# Patient Record
Sex: FEMALE | Race: WHITE | NOT HISPANIC OR LATINO | Employment: FULL TIME | URBAN - METROPOLITAN AREA
[De-identification: names, ages, dates, MRNs, and addresses within clinical notes are randomized per-mention and may not be internally consistent; named-entity substitution may affect disease eponyms.]

---

## 2019-10-01 ENCOUNTER — OFFICE VISIT (OUTPATIENT)
Dept: FAMILY MEDICINE | Facility: CLINIC | Age: 34
End: 2019-10-01
Payer: COMMERCIAL

## 2019-10-01 VITALS
BODY MASS INDEX: 36.53 KG/M2 | HEIGHT: 66 IN | HEART RATE: 96 BPM | OXYGEN SATURATION: 98 % | WEIGHT: 227.31 LBS | DIASTOLIC BLOOD PRESSURE: 80 MMHG | SYSTOLIC BLOOD PRESSURE: 118 MMHG

## 2019-10-01 DIAGNOSIS — F41.9 ANXIETY: ICD-10-CM

## 2019-10-01 DIAGNOSIS — Z23 NEED FOR TDAP VACCINATION: ICD-10-CM

## 2019-10-01 DIAGNOSIS — Z00.00 HEALTHCARE MAINTENANCE: ICD-10-CM

## 2019-10-01 DIAGNOSIS — I88.9 LYMPHADENITIS: Primary | ICD-10-CM

## 2019-10-01 PROBLEM — Z78.9 NON-SMOKER: Status: ACTIVE | Noted: 2019-10-01

## 2019-10-01 PROCEDURE — 90715 TDAP VACCINE 7 YRS/> IM: CPT | Mod: S$GLB,,, | Performed by: NURSE PRACTITIONER

## 2019-10-01 PROCEDURE — 90715 TDAP VACCINE GREATER THAN OR EQUAL TO 7YO IM: ICD-10-PCS | Mod: S$GLB,,, | Performed by: NURSE PRACTITIONER

## 2019-10-01 PROCEDURE — 3008F PR BODY MASS INDEX (BMI) DOCUMENTED: ICD-10-PCS | Mod: S$GLB,,, | Performed by: NURSE PRACTITIONER

## 2019-10-01 PROCEDURE — 99214 PR OFFICE/OUTPT VISIT, EST, LEVL IV, 30-39 MIN: ICD-10-PCS | Mod: 25,S$GLB,, | Performed by: NURSE PRACTITIONER

## 2019-10-01 PROCEDURE — 90471 TDAP VACCINE GREATER THAN OR EQUAL TO 7YO IM: ICD-10-PCS | Mod: S$GLB,,, | Performed by: NURSE PRACTITIONER

## 2019-10-01 PROCEDURE — 90471 IMMUNIZATION ADMIN: CPT | Mod: S$GLB,,, | Performed by: NURSE PRACTITIONER

## 2019-10-01 PROCEDURE — 3008F BODY MASS INDEX DOCD: CPT | Mod: S$GLB,,, | Performed by: NURSE PRACTITIONER

## 2019-10-01 PROCEDURE — 99214 OFFICE O/P EST MOD 30 MIN: CPT | Mod: 25,S$GLB,, | Performed by: NURSE PRACTITIONER

## 2019-10-01 RX ORDER — TALC
3 POWDER (GRAM) TOPICAL NIGHTLY
Refills: 0 | COMMUNITY
Start: 2019-10-01

## 2019-10-01 RX ORDER — AMOXICILLIN AND CLAVULANATE POTASSIUM 875; 125 MG/1; MG/1
1 TABLET, FILM COATED ORAL 2 TIMES DAILY
Qty: 20 TABLET | Refills: 0 | Status: ON HOLD | OUTPATIENT
Start: 2019-10-01 | End: 2022-01-19 | Stop reason: HOSPADM

## 2019-10-01 NOTE — PROGRESS NOTES
"    SUBJECTIVE:      Patient ID: Jenny Bailey is a 33 y.o. female.    Chief Complaint: Anxiety (x 1 year) and Mass (in Rt armpit)    Jenny is here today with c/o increasing irritability. She reports it has gotten worse over the last year or so.  She states even the little things are bothering her.  She denies any recent life changes and she denies depression as well.      She also has c/o a tender, lumpy area in her right axilla.  She states it has been tender for about 2 weeks and is not improving.  The "lumpy" part has been on and off for about a year or so.    Anxiety   Presents for follow-up visit. Symptoms include excessive worry, insomnia, irritability and muscle tension. Patient reports no chest pain, compulsions, confusion, decreased concentration, depressed mood, dizziness, dry mouth, feeling of choking, malaise, nausea, nervous/anxious behavior, obsessions, palpitations, panic, restlessness, shortness of breath or suicidal ideas. Symptoms occur most days. The severity of symptoms is moderate. The quality of sleep is poor. Nighttime awakenings: several.       Mass   This is a recurrent problem. The current episode started 1 to 4 weeks ago. The problem occurs constantly. The problem has been unchanged. Pertinent negatives include no abdominal pain, anorexia, arthralgias, change in bowel habit, chest pain, chills, congestion, coughing, diaphoresis, fatigue, fever, headaches, joint swelling, myalgias, nausea, neck pain, numbness, rash, sore throat, urinary symptoms, vertigo, visual change, vomiting or weakness. Exacerbated by: palpation. She has tried nothing for the symptoms.       History reviewed. No pertinent surgical history.  Family History   Problem Relation Age of Onset    Coronary artery disease Maternal Grandmother     Depression Maternal Grandmother     Diabetes type II Maternal Grandmother     Breast cancer Paternal Grandmother     Pancreatic cancer Paternal Grandmother     Hyperlipidemia " Mother     Drug abuse Father       Social History     Socioeconomic History    Marital status:      Spouse name: Not on file    Number of children: Not on file    Years of education: Not on file    Highest education level: Not on file   Occupational History    Occupation: Medical  Assistant   Social Needs    Financial resource strain: Not hard at all    Food insecurity:     Worry: Never true     Inability: Never true    Transportation needs:     Medical: No     Non-medical: No   Tobacco Use    Smoking status: Never Smoker    Smokeless tobacco: Never Used   Substance and Sexual Activity    Alcohol use: Not on file    Drug use: Not Currently    Sexual activity: Yes     Partners: Female   Lifestyle    Physical activity:     Days per week: 4 days     Minutes per session: 120 min    Stress: To some extent   Relationships    Social connections:     Talks on phone: More than three times a week     Gets together: Once a week     Attends [employer] service: Not on file     Active member of club or organization: Yes     Attends meetings of clubs or organizations: More than 4 times per year     Relationship status:    Other Topics Concern    Not on file   Social History Narrative    Not on file     Current Outpatient Medications   Medication Sig Dispense Refill    ranitidine (ZANTAC) 150 MG tablet       amoxicillin-clavulanate 875-125mg (AUGMENTIN) 875-125 mg per tablet Take 1 tablet by mouth 2 (two) times daily. 20 tablet 0    melatonin (MELATIN) Take 1 tablet (3 mg total) by mouth nightly.  0     No current facility-administered medications for this visit.      Review of patient's allergies indicates:  No Known Allergies   History reviewed. No pertinent past medical history.  History reviewed. No pertinent surgical history.    Review of Systems   Constitutional: Positive for irritability. Negative for activity change, chills, diaphoresis, fatigue, fever and unexpected weight change.   HENT:  "Negative for congestion, hearing loss, rhinorrhea, sore throat and trouble swallowing.    Eyes: Negative for discharge and visual disturbance.   Respiratory: Negative for cough, chest tightness, shortness of breath and wheezing.    Cardiovascular: Negative for chest pain and palpitations.   Gastrointestinal: Negative for abdominal pain, anorexia, blood in stool, change in bowel habit, constipation, diarrhea, nausea and vomiting.   Endocrine: Negative for polydipsia and polyuria.   Genitourinary: Negative for difficulty urinating, dysuria, hematuria, menstrual problem, vaginal bleeding and vaginal discharge.   Musculoskeletal: Negative for arthralgias, joint swelling, myalgias and neck pain.   Skin: Negative for rash.   Neurological: Negative for dizziness, vertigo, weakness, numbness and headaches.   Hematological: Does not bruise/bleed easily.   Psychiatric/Behavioral: Negative for confusion, decreased concentration, dysphoric mood and suicidal ideas. The patient has insomnia. The patient is not nervous/anxious.       OBJECTIVE:      Vitals:    10/01/19 1323   BP: 118/80   Pulse: 96   SpO2: 98%   Weight: 103.1 kg (227 lb 4.8 oz)   Height: 5' 6" (1.676 m)     Physical Exam   Constitutional: She is oriented to person, place, and time. She appears well-developed and well-nourished. No distress.   HENT:   Head: Normocephalic and atraumatic.   Right Ear: Tympanic membrane, external ear and ear canal normal.   Left Ear: Tympanic membrane, external ear and ear canal normal.   Nose: Nose normal. No mucosal edema or rhinorrhea.   Mouth/Throat: Uvula is midline and oropharynx is clear and moist. No oropharyngeal exudate, posterior oropharyngeal edema or posterior oropharyngeal erythema.   Eyes: Pupils are equal, round, and reactive to light. Conjunctivae and lids are normal. Right eye exhibits no discharge. Left eye exhibits no discharge. No scleral icterus.   Neck: Normal range of motion. Neck supple. Carotid bruit is not " present. No tracheal deviation present. No thyromegaly present.   Cardiovascular: Normal rate, regular rhythm, normal heart sounds and intact distal pulses. Exam reveals no gallop and no friction rub.   No murmur heard.  Pulmonary/Chest: Effort normal and breath sounds normal. No stridor. No respiratory distress. She has no wheezes. She has no rales.   Abdominal: Soft. Bowel sounds are normal. She exhibits no distension and no mass. There is no hepatosplenomegaly. There is no tenderness. There is no rigidity, no rebound, no guarding and no CVA tenderness.   Musculoskeletal: Normal range of motion. She exhibits no edema.   Lymphadenopathy:        Head (right side): No submandibular, no tonsillar, no posterior auricular and no occipital adenopathy present.        Head (left side): No submandibular, no tonsillar, no posterior auricular and no occipital adenopathy present.     She has no cervical adenopathy.     She has axillary adenopathy.        Right axillary: Lateral adenopathy present.        Left axillary: No lateral adenopathy present.       Right: No supraclavicular adenopathy present.        Left: No supraclavicular adenopathy present.   Neurological: She is alert and oriented to person, place, and time.   Skin: Skin is warm, dry and intact. Capillary refill takes less than 2 seconds. She is not diaphoretic.   Psychiatric: She has a normal mood and affect. Her behavior is normal. Judgment and thought content normal. She expresses no suicidal plans.      Assessment:       1. Lymphadenitis    2. Anxiety    3. Need for Tdap vaccination    4. Healthcare maintenance        Plan:       Lymphadenitis  -     US Soft Tissue Axilla; Future; Expected date: 10/01/2019  -     amoxicillin-clavulanate 875-125mg (AUGMENTIN) 875-125 mg per tablet; Take 1 tablet by mouth 2 (two) times daily.  Dispense: 20 tablet; Refill: 0    Anxiety   Proper sleep hygiene discussed; try melatonin at hs to help with sleep.  Check labs; follow up  if anxiety continues with proper sleep hygiene and labs are normal; understanding voiced.  -     melatonin (MELATIN); Take 1 tablet (3 mg total) by mouth nightly.; Refill: 0  -     TSH; Future; Expected date: 10/01/2019  -     Vitamin D; Future; Expected date: 10/01/2019    Need for Tdap vaccination  -     Tdap Vaccine    Healthcare maintenance  -     CBC auto differential; Future; Expected date: 10/01/2019  -     Comprehensive metabolic panel; Future; Expected date: 10/01/2019  -     Lipid panel; Future; Expected date: 10/01/2019  -     TSH; Future; Expected date: 10/01/2019  -     Urinalysis; Future  -     Vitamin D; Future; Expected date: 10/01/2019        Follow up if symptoms worsen or fail to improve.      10/1/2019 ANGELITO Light, AZIZAP

## 2019-10-03 LAB
25(OH)D3+25(OH)D2 SERPL-MCNC: 15.8 NG/ML (ref 30–100)
ALBUMIN SERPL-MCNC: 4.5 G/DL (ref 3.5–5.5)
ALBUMIN/GLOB SERPL: 1.7 {RATIO} (ref 1.2–2.2)
ALP SERPL-CCNC: 88 IU/L (ref 39–117)
ALT SERPL-CCNC: 12 IU/L (ref 0–32)
APPEARANCE UR: CLEAR
AST SERPL-CCNC: 13 IU/L (ref 0–40)
BASOPHILS # BLD AUTO: 0 X10E3/UL (ref 0–0.2)
BASOPHILS NFR BLD AUTO: 1 %
BILIRUB SERPL-MCNC: 0.3 MG/DL (ref 0–1.2)
BILIRUB UR QL STRIP: NEGATIVE
BUN SERPL-MCNC: 11 MG/DL (ref 6–20)
BUN/CREAT SERPL: 14 (ref 9–23)
CALCIUM SERPL-MCNC: 9.7 MG/DL (ref 8.7–10.2)
CHLORIDE SERPL-SCNC: 102 MMOL/L (ref 96–106)
CHOLEST SERPL-MCNC: 206 MG/DL (ref 100–199)
CO2 SERPL-SCNC: 21 MMOL/L (ref 20–29)
COLOR UR: YELLOW
CREAT SERPL-MCNC: 0.79 MG/DL (ref 0.57–1)
EOSINOPHIL # BLD AUTO: 0.2 X10E3/UL (ref 0–0.4)
EOSINOPHIL NFR BLD AUTO: 3 %
ERYTHROCYTE [DISTWIDTH] IN BLOOD BY AUTOMATED COUNT: 14 % (ref 12.3–15.4)
GLOBULIN SER CALC-MCNC: 2.7 G/DL (ref 1.5–4.5)
GLUCOSE SERPL-MCNC: 95 MG/DL (ref 65–99)
GLUCOSE UR QL: NEGATIVE
HCT VFR BLD AUTO: 39.7 % (ref 34–46.6)
HDLC SERPL-MCNC: 48 MG/DL
HGB BLD-MCNC: 12 G/DL (ref 11.1–15.9)
HGB UR QL STRIP: NEGATIVE
IMM GRANULOCYTES # BLD AUTO: 0 X10E3/UL (ref 0–0.1)
IMM GRANULOCYTES NFR BLD AUTO: 0 %
KETONES UR QL STRIP: NEGATIVE
LDLC SERPL CALC-MCNC: 134 MG/DL (ref 0–99)
LEUKOCYTE ESTERASE UR QL STRIP: NEGATIVE
LYMPHOCYTES # BLD AUTO: 1.9 X10E3/UL (ref 0.7–3.1)
LYMPHOCYTES NFR BLD AUTO: 30 %
MCH RBC QN AUTO: 24.8 PG (ref 26.6–33)
MCHC RBC AUTO-ENTMCNC: 30.2 G/DL (ref 31.5–35.7)
MCV RBC AUTO: 82 FL (ref 79–97)
MICRO URNS: NORMAL
MONOCYTES # BLD AUTO: 0.5 X10E3/UL (ref 0.1–0.9)
MONOCYTES NFR BLD AUTO: 9 %
NEUTROPHILS # BLD AUTO: 3.7 X10E3/UL (ref 1.4–7)
NEUTROPHILS NFR BLD AUTO: 57 %
NITRITE UR QL STRIP: NEGATIVE
PH UR STRIP: 7.5 [PH] (ref 5–7.5)
PLATELET # BLD AUTO: 287 X10E3/UL (ref 150–450)
POTASSIUM SERPL-SCNC: 4.5 MMOL/L (ref 3.5–5.2)
PROT SERPL-MCNC: 7.2 G/DL (ref 6–8.5)
PROT UR QL STRIP: NEGATIVE
RBC # BLD AUTO: 4.84 X10E6/UL (ref 3.77–5.28)
SODIUM SERPL-SCNC: 142 MMOL/L (ref 134–144)
SP GR UR: 1.01 (ref 1–1.03)
TRIGL SERPL-MCNC: 118 MG/DL (ref 0–149)
TSH SERPL DL<=0.005 MIU/L-ACNC: 1.07 UIU/ML (ref 0.45–4.5)
UROBILINOGEN UR STRIP-MCNC: 0.2 MG/DL (ref 0.2–1)
VLDLC SERPL CALC-MCNC: 24 MG/DL (ref 5–40)
WBC # BLD AUTO: 6.3 X10E3/UL (ref 3.4–10.8)

## 2019-10-05 ENCOUNTER — PATIENT MESSAGE (OUTPATIENT)
Dept: FAMILY MEDICINE | Facility: CLINIC | Age: 34
End: 2019-10-05

## 2019-10-07 ENCOUNTER — TELEPHONE (OUTPATIENT)
Dept: FAMILY MEDICINE | Facility: CLINIC | Age: 34
End: 2019-10-07

## 2019-10-07 ENCOUNTER — HOSPITAL ENCOUNTER (OUTPATIENT)
Dept: RADIOLOGY | Facility: HOSPITAL | Age: 34
Discharge: HOME | End: 2019-10-07
Attending: NURSE PRACTITIONER
Payer: COMMERCIAL

## 2019-10-07 DIAGNOSIS — I88.9 LYMPHADENITIS: ICD-10-CM

## 2019-10-07 PROCEDURE — 76882 US LMTD JT/FCL EVL NVASC XTR: CPT | Mod: TC,PO

## 2019-10-07 RX ORDER — FLUCONAZOLE 150 MG/1
150 TABLET ORAL DAILY
Qty: 1 TABLET | Refills: 0 | Status: SHIPPED | OUTPATIENT
Start: 2019-10-07 | End: 2019-10-08

## 2022-01-18 ENCOUNTER — ANESTHESIA (OUTPATIENT)
Dept: SURGERY | Facility: HOSPITAL | Age: 37
End: 2022-01-18
Payer: COMMERCIAL

## 2022-01-18 ENCOUNTER — ANESTHESIA EVENT (OUTPATIENT)
Dept: SURGERY | Facility: HOSPITAL | Age: 37
End: 2022-01-18
Payer: COMMERCIAL

## 2022-01-18 ENCOUNTER — HOSPITAL ENCOUNTER (OUTPATIENT)
Facility: HOSPITAL | Age: 37
Discharge: HOME | End: 2022-01-19
Attending: EMERGENCY MEDICINE | Admitting: FAMILY MEDICINE
Payer: COMMERCIAL

## 2022-01-18 ENCOUNTER — HOSPITAL ENCOUNTER (OUTPATIENT)
Dept: RADIOLOGY | Facility: HOSPITAL | Age: 37
Discharge: HOME | End: 2022-01-18
Attending: NURSE PRACTITIONER
Payer: COMMERCIAL

## 2022-01-18 DIAGNOSIS — K35.30 ACUTE APPENDICITIS WITH LOCALIZED PERITONITIS, WITHOUT PERFORATION, ABSCESS, OR GANGRENE: Primary | ICD-10-CM

## 2022-01-18 DIAGNOSIS — K35.80 ACUTE APPENDICITIS, UNSPECIFIED ACUTE APPENDICITIS TYPE: ICD-10-CM

## 2022-01-18 DIAGNOSIS — R10.9 ABDOMINAL PAIN: Primary | ICD-10-CM

## 2022-01-18 DIAGNOSIS — K35.890 OTHER ACUTE APPENDICITIS: ICD-10-CM

## 2022-01-18 DIAGNOSIS — R10.9 ABDOMINAL PAIN: ICD-10-CM

## 2022-01-18 LAB
ALBUMIN SERPL BCP-MCNC: 4.3 G/DL (ref 3.5–5.2)
ALP SERPL-CCNC: 74 U/L (ref 55–135)
ALT SERPL W/O P-5'-P-CCNC: 12 U/L (ref 10–44)
ANION GAP SERPL CALC-SCNC: 12 MMOL/L (ref 8–16)
AST SERPL-CCNC: 16 U/L (ref 10–40)
BASOPHILS # BLD AUTO: 0.03 K/UL (ref 0–0.2)
BASOPHILS NFR BLD: 0.3 % (ref 0–1.9)
BILIRUB SERPL-MCNC: 0.8 MG/DL (ref 0.1–1)
BILIRUB UR QL STRIP: NEGATIVE
BUN SERPL-MCNC: 6 MG/DL (ref 6–20)
CALCIUM SERPL-MCNC: 9.1 MG/DL (ref 8.7–10.5)
CHLORIDE SERPL-SCNC: 98 MMOL/L (ref 95–110)
CLARITY UR: CLEAR
CO2 SERPL-SCNC: 23 MMOL/L (ref 23–29)
COLOR UR: YELLOW
CREAT SERPL-MCNC: 0.7 MG/DL (ref 0.5–1.4)
DIFFERENTIAL METHOD: ABNORMAL
EOSINOPHIL # BLD AUTO: 0.2 K/UL (ref 0–0.5)
EOSINOPHIL NFR BLD: 1.6 % (ref 0–8)
ERYTHROCYTE [DISTWIDTH] IN BLOOD BY AUTOMATED COUNT: 15 % (ref 11.5–14.5)
EST. GFR  (AFRICAN AMERICAN): >60 ML/MIN/1.73 M^2
EST. GFR  (NON AFRICAN AMERICAN): >60 ML/MIN/1.73 M^2
GLUCOSE SERPL-MCNC: 96 MG/DL (ref 70–110)
GLUCOSE UR QL STRIP: NEGATIVE
HCT VFR BLD AUTO: 34.2 % (ref 37–48.5)
HGB BLD-MCNC: 10.7 G/DL (ref 12–16)
HGB UR QL STRIP: NEGATIVE
IMM GRANULOCYTES # BLD AUTO: 0.03 K/UL (ref 0–0.04)
IMM GRANULOCYTES NFR BLD AUTO: 0.3 % (ref 0–0.5)
KETONES UR QL STRIP: NEGATIVE
LEUKOCYTE ESTERASE UR QL STRIP: NEGATIVE
LIPASE SERPL-CCNC: 25 U/L (ref 4–60)
LYMPHOCYTES # BLD AUTO: 1.3 K/UL (ref 1–4.8)
LYMPHOCYTES NFR BLD: 14.6 % (ref 18–48)
MCH RBC QN AUTO: 22.9 PG (ref 27–31)
MCHC RBC AUTO-ENTMCNC: 31.3 G/DL (ref 32–36)
MCV RBC AUTO: 73 FL (ref 82–98)
MONOCYTES # BLD AUTO: 0.8 K/UL (ref 0.3–1)
MONOCYTES NFR BLD: 8.6 % (ref 4–15)
NEUTROPHILS # BLD AUTO: 6.8 K/UL (ref 1.8–7.7)
NEUTROPHILS NFR BLD: 74.6 % (ref 38–73)
NITRITE UR QL STRIP: NEGATIVE
NRBC BLD-RTO: 0 /100 WBC
PH UR STRIP: 7 [PH] (ref 5–8)
PLATELET # BLD AUTO: 202 K/UL (ref 150–450)
PMV BLD AUTO: 12.4 FL (ref 9.2–12.9)
POTASSIUM SERPL-SCNC: 3.5 MMOL/L (ref 3.5–5.1)
PROT SERPL-MCNC: 7.5 G/DL (ref 6–8.4)
PROT UR QL STRIP: NEGATIVE
RBC # BLD AUTO: 4.68 M/UL (ref 4–5.4)
SARS-COV-2 RDRP RESP QL NAA+PROBE: NEGATIVE
SODIUM SERPL-SCNC: 133 MMOL/L (ref 136–145)
SP GR UR STRIP: >1.03 (ref 1–1.03)
URN SPEC COLLECT METH UR: ABNORMAL
UROBILINOGEN UR STRIP-ACNC: NEGATIVE EU/DL
WBC # BLD AUTO: 9.1 K/UL (ref 3.9–12.7)

## 2022-01-18 PROCEDURE — U0002 COVID-19 LAB TEST NON-CDC: HCPCS | Performed by: EMERGENCY MEDICINE

## 2022-01-18 PROCEDURE — 63600175 PHARM REV CODE 636 W HCPCS: Performed by: SURGERY

## 2022-01-18 PROCEDURE — 71000039 HC RECOVERY, EACH ADD'L HOUR: Performed by: SURGERY

## 2022-01-18 PROCEDURE — 36000709 HC OR TIME LEV III EA ADD 15 MIN: Performed by: SURGERY

## 2022-01-18 PROCEDURE — 25000003 PHARM REV CODE 250: Performed by: NURSE ANESTHETIST, CERTIFIED REGISTERED

## 2022-01-18 PROCEDURE — 25000003 PHARM REV CODE 250: Performed by: INTERNAL MEDICINE

## 2022-01-18 PROCEDURE — 37000009 HC ANESTHESIA EA ADD 15 MINS: Performed by: SURGERY

## 2022-01-18 PROCEDURE — 63600175 PHARM REV CODE 636 W HCPCS: Performed by: ANESTHESIOLOGY

## 2022-01-18 PROCEDURE — 25500020 PHARM REV CODE 255: Mod: PO

## 2022-01-18 PROCEDURE — 85025 COMPLETE CBC W/AUTO DIFF WBC: CPT | Performed by: EMERGENCY MEDICINE

## 2022-01-18 PROCEDURE — 63600175 PHARM REV CODE 636 W HCPCS: Performed by: NURSE ANESTHETIST, CERTIFIED REGISTERED

## 2022-01-18 PROCEDURE — G0378 HOSPITAL OBSERVATION PER HR: HCPCS

## 2022-01-18 PROCEDURE — 25000003 PHARM REV CODE 250: Performed by: EMERGENCY MEDICINE

## 2022-01-18 PROCEDURE — 25000003 PHARM REV CODE 250: Performed by: NURSE PRACTITIONER

## 2022-01-18 PROCEDURE — 80053 COMPREHEN METABOLIC PANEL: CPT | Performed by: EMERGENCY MEDICINE

## 2022-01-18 PROCEDURE — 71000033 HC RECOVERY, INTIAL HOUR: Performed by: SURGERY

## 2022-01-18 PROCEDURE — 25000003 PHARM REV CODE 250: Performed by: ANESTHESIOLOGY

## 2022-01-18 PROCEDURE — 27201423 OPTIME MED/SURG SUP & DEVICES STERILE SUPPLY: Performed by: SURGERY

## 2022-01-18 PROCEDURE — 63600175 PHARM REV CODE 636 W HCPCS: Performed by: EMERGENCY MEDICINE

## 2022-01-18 PROCEDURE — 83690 ASSAY OF LIPASE: CPT | Performed by: EMERGENCY MEDICINE

## 2022-01-18 PROCEDURE — 74177 CT ABD & PELVIS W/CONTRAST: CPT | Mod: TC,PO

## 2022-01-18 PROCEDURE — 36000708 HC OR TIME LEV III 1ST 15 MIN: Performed by: SURGERY

## 2022-01-18 PROCEDURE — 37000008 HC ANESTHESIA 1ST 15 MINUTES: Performed by: SURGERY

## 2022-01-18 PROCEDURE — 25000003 PHARM REV CODE 250: Performed by: SURGERY

## 2022-01-18 PROCEDURE — C9290 INJ, BUPIVACAINE LIPOSOME: HCPCS | Performed by: SURGERY

## 2022-01-18 PROCEDURE — 96365 THER/PROPH/DIAG IV INF INIT: CPT

## 2022-01-18 PROCEDURE — 96375 TX/PRO/DX INJ NEW DRUG ADDON: CPT | Mod: 59

## 2022-01-18 PROCEDURE — 99285 EMERGENCY DEPT VISIT HI MDM: CPT | Mod: 25

## 2022-01-18 PROCEDURE — 44970 LAPAROSCOPY APPENDECTOMY: CPT | Mod: ,,, | Performed by: SURGERY

## 2022-01-18 PROCEDURE — 44970 PR LAP,APPENDECTOMY: ICD-10-PCS | Mod: ,,, | Performed by: SURGERY

## 2022-01-18 PROCEDURE — 99204 PR OFFICE/OUTPT VISIT, NEW, LEVL IV, 45-59 MIN: ICD-10-PCS | Mod: 57,,, | Performed by: SURGERY

## 2022-01-18 PROCEDURE — 81003 URINALYSIS AUTO W/O SCOPE: CPT | Performed by: EMERGENCY MEDICINE

## 2022-01-18 PROCEDURE — 99204 OFFICE O/P NEW MOD 45 MIN: CPT | Mod: 57,,, | Performed by: SURGERY

## 2022-01-18 RX ORDER — SUCCINYLCHOLINE CHLORIDE 20 MG/ML
INJECTION INTRAMUSCULAR; INTRAVENOUS
Status: DISCONTINUED | OUTPATIENT
Start: 2022-01-18 | End: 2022-01-18

## 2022-01-18 RX ORDER — OXYCODONE HYDROCHLORIDE 5 MG/1
5 TABLET ORAL
Status: DISCONTINUED | OUTPATIENT
Start: 2022-01-18 | End: 2022-01-18 | Stop reason: HOSPADM

## 2022-01-18 RX ORDER — MIDAZOLAM HYDROCHLORIDE 1 MG/ML
INJECTION INTRAMUSCULAR; INTRAVENOUS
Status: DISCONTINUED | OUTPATIENT
Start: 2022-01-18 | End: 2022-01-18

## 2022-01-18 RX ORDER — DIPHENHYDRAMINE HYDROCHLORIDE 50 MG/ML
INJECTION INTRAMUSCULAR; INTRAVENOUS
Status: DISCONTINUED | OUTPATIENT
Start: 2022-01-18 | End: 2022-01-18

## 2022-01-18 RX ORDER — FAMOTIDINE 10 MG/ML
INJECTION INTRAVENOUS
Status: DISCONTINUED | OUTPATIENT
Start: 2022-01-18 | End: 2022-01-18

## 2022-01-18 RX ORDER — FENTANYL CITRATE 50 UG/ML
INJECTION, SOLUTION INTRAMUSCULAR; INTRAVENOUS
Status: DISCONTINUED | OUTPATIENT
Start: 2022-01-18 | End: 2022-01-18

## 2022-01-18 RX ORDER — CEFOXITIN SODIUM 2 G/50ML
INJECTION, SOLUTION INTRAVENOUS
Status: DISCONTINUED | OUTPATIENT
Start: 2022-01-18 | End: 2022-01-18

## 2022-01-18 RX ORDER — HYDROMORPHONE HYDROCHLORIDE 1 MG/ML
0.2 INJECTION, SOLUTION INTRAMUSCULAR; INTRAVENOUS; SUBCUTANEOUS EVERY 5 MIN PRN
Status: DISCONTINUED | OUTPATIENT
Start: 2022-01-18 | End: 2022-01-18 | Stop reason: HOSPADM

## 2022-01-18 RX ORDER — LIDOCAINE HYDROCHLORIDE 20 MG/ML
INJECTION, SOLUTION EPIDURAL; INFILTRATION; INTRACAUDAL; PERINEURAL
Status: DISCONTINUED | OUTPATIENT
Start: 2022-01-18 | End: 2022-01-18

## 2022-01-18 RX ORDER — HYDROMORPHONE HYDROCHLORIDE 1 MG/ML
1 INJECTION, SOLUTION INTRAMUSCULAR; INTRAVENOUS; SUBCUTANEOUS EVERY 6 HOURS PRN
Status: DISCONTINUED | OUTPATIENT
Start: 2022-01-18 | End: 2022-01-19 | Stop reason: HOSPADM

## 2022-01-18 RX ORDER — SCOLOPAMINE TRANSDERMAL SYSTEM 1 MG/1
PATCH, EXTENDED RELEASE TRANSDERMAL
Status: DISCONTINUED | OUTPATIENT
Start: 2022-01-18 | End: 2022-01-18

## 2022-01-18 RX ORDER — PROCHLORPERAZINE EDISYLATE 5 MG/ML
5 INJECTION INTRAMUSCULAR; INTRAVENOUS EVERY 30 MIN PRN
Status: DISCONTINUED | OUTPATIENT
Start: 2022-01-18 | End: 2022-01-18 | Stop reason: HOSPADM

## 2022-01-18 RX ORDER — ALPRAZOLAM 0.5 MG/1
0.5 TABLET ORAL 2 TIMES DAILY
Status: DISCONTINUED | OUTPATIENT
Start: 2022-01-18 | End: 2022-01-19 | Stop reason: HOSPADM

## 2022-01-18 RX ORDER — SODIUM CHLORIDE 0.9 % (FLUSH) 0.9 %
10 SYRINGE (ML) INJECTION
Status: DISCONTINUED | OUTPATIENT
Start: 2022-01-18 | End: 2022-01-19 | Stop reason: HOSPADM

## 2022-01-18 RX ORDER — SODIUM CHLORIDE, SODIUM LACTATE, POTASSIUM CHLORIDE, CALCIUM CHLORIDE 600; 310; 30; 20 MG/100ML; MG/100ML; MG/100ML; MG/100ML
1000 INJECTION, SOLUTION INTRAVENOUS DAILY
Status: DISCONTINUED | OUTPATIENT
Start: 2022-01-18 | End: 2022-01-19

## 2022-01-18 RX ORDER — ACETAMINOPHEN 10 MG/ML
INJECTION, SOLUTION INTRAVENOUS
Status: DISCONTINUED | OUTPATIENT
Start: 2022-01-18 | End: 2022-01-18

## 2022-01-18 RX ORDER — ROCURONIUM BROMIDE 10 MG/ML
INJECTION, SOLUTION INTRAVENOUS
Status: DISCONTINUED | OUTPATIENT
Start: 2022-01-18 | End: 2022-01-18

## 2022-01-18 RX ORDER — BUPIVACAINE HYDROCHLORIDE AND EPINEPHRINE 2.5; 5 MG/ML; UG/ML
INJECTION, SOLUTION EPIDURAL; INFILTRATION; INTRACAUDAL; PERINEURAL
Status: DISCONTINUED | OUTPATIENT
Start: 2022-01-18 | End: 2022-01-18 | Stop reason: HOSPADM

## 2022-01-18 RX ORDER — ACETAMINOPHEN 325 MG/1
650 TABLET ORAL EVERY 6 HOURS PRN
Status: DISCONTINUED | OUTPATIENT
Start: 2022-01-19 | End: 2022-01-19 | Stop reason: HOSPADM

## 2022-01-18 RX ORDER — DIPHENHYDRAMINE HYDROCHLORIDE 50 MG/ML
12.5 INJECTION INTRAMUSCULAR; INTRAVENOUS
Status: DISCONTINUED | OUTPATIENT
Start: 2022-01-18 | End: 2022-01-18 | Stop reason: HOSPADM

## 2022-01-18 RX ORDER — IBUPROFEN 200 MG
800 TABLET ORAL ONCE AS NEEDED
Status: ON HOLD | COMMUNITY
End: 2022-01-19 | Stop reason: HOSPADM

## 2022-01-18 RX ORDER — ONDANSETRON 2 MG/ML
4 INJECTION INTRAMUSCULAR; INTRAVENOUS DAILY PRN
Status: DISCONTINUED | OUTPATIENT
Start: 2022-01-18 | End: 2022-01-18 | Stop reason: HOSPADM

## 2022-01-18 RX ORDER — ONDANSETRON 2 MG/ML
INJECTION INTRAMUSCULAR; INTRAVENOUS
Status: DISCONTINUED | OUTPATIENT
Start: 2022-01-18 | End: 2022-01-18

## 2022-01-18 RX ORDER — OXYCODONE HYDROCHLORIDE 5 MG/1
5 TABLET ORAL EVERY 6 HOURS PRN
Status: DISCONTINUED | OUTPATIENT
Start: 2022-01-18 | End: 2022-01-19 | Stop reason: HOSPADM

## 2022-01-18 RX ORDER — PROPOFOL 10 MG/ML
VIAL (ML) INTRAVENOUS
Status: DISCONTINUED | OUTPATIENT
Start: 2022-01-18 | End: 2022-01-18

## 2022-01-18 RX ORDER — ONDANSETRON 2 MG/ML
4 INJECTION INTRAMUSCULAR; INTRAVENOUS EVERY 8 HOURS PRN
Status: DISCONTINUED | OUTPATIENT
Start: 2022-01-18 | End: 2022-01-19 | Stop reason: HOSPADM

## 2022-01-18 RX ORDER — SODIUM CHLORIDE 9 MG/ML
INJECTION, SOLUTION INTRAVENOUS
Status: COMPLETED | OUTPATIENT
Start: 2022-01-18 | End: 2022-01-18

## 2022-01-18 RX ORDER — SODIUM CHLORIDE, SODIUM LACTATE, POTASSIUM CHLORIDE, CALCIUM CHLORIDE 600; 310; 30; 20 MG/100ML; MG/100ML; MG/100ML; MG/100ML
INJECTION, SOLUTION INTRAVENOUS CONTINUOUS PRN
Status: DISCONTINUED | OUTPATIENT
Start: 2022-01-18 | End: 2022-01-18

## 2022-01-18 RX ORDER — ACETAMINOPHEN 325 MG/1
650 TABLET ORAL EVERY 8 HOURS PRN
Status: DISCONTINUED | OUTPATIENT
Start: 2022-01-18 | End: 2022-01-18

## 2022-01-18 RX ORDER — TALC
6 POWDER (GRAM) TOPICAL NIGHTLY PRN
Status: DISCONTINUED | OUTPATIENT
Start: 2022-01-18 | End: 2022-01-19 | Stop reason: HOSPADM

## 2022-01-18 RX ORDER — ALPRAZOLAM 0.5 MG/1
1 TABLET ORAL 2 TIMES DAILY
COMMUNITY
Start: 2021-12-23

## 2022-01-18 RX ORDER — MEPERIDINE HYDROCHLORIDE 50 MG/ML
12.5 INJECTION INTRAMUSCULAR; INTRAVENOUS; SUBCUTANEOUS EVERY 10 MIN PRN
Status: DISCONTINUED | OUTPATIENT
Start: 2022-01-18 | End: 2022-01-18 | Stop reason: HOSPADM

## 2022-01-18 RX ORDER — LIDOCAINE HYDROCHLORIDE 20 MG/ML
JELLY TOPICAL
Status: DISCONTINUED | OUTPATIENT
Start: 2022-01-18 | End: 2022-01-18

## 2022-01-18 RX ORDER — FAMOTIDINE 10 MG/ML
20 INJECTION INTRAVENOUS DAILY
Status: DISCONTINUED | OUTPATIENT
Start: 2022-01-19 | End: 2022-01-19 | Stop reason: HOSPADM

## 2022-01-18 RX ADMIN — PROPOFOL 150 MG: 10 INJECTION, EMULSION INTRAVENOUS at 06:01

## 2022-01-18 RX ADMIN — MIDAZOLAM HYDROCHLORIDE 2 MG: 1 INJECTION, SOLUTION INTRAMUSCULAR; INTRAVENOUS at 05:01

## 2022-01-18 RX ADMIN — DIPHENHYDRAMINE HYDROCHLORIDE 6.25 MG: 50 INJECTION, SOLUTION INTRAMUSCULAR; INTRAVENOUS at 05:01

## 2022-01-18 RX ADMIN — SODIUM CHLORIDE, SODIUM LACTATE, POTASSIUM CHLORIDE, AND CALCIUM CHLORIDE: .6; .31; .03; .02 INJECTION, SOLUTION INTRAVENOUS at 05:01

## 2022-01-18 RX ADMIN — FENTANYL CITRATE 50 MCG: 50 INJECTION INTRAMUSCULAR; INTRAVENOUS at 07:01

## 2022-01-18 RX ADMIN — ROCURONIUM BROMIDE 35 MG: 10 INJECTION, SOLUTION INTRAVENOUS at 06:01

## 2022-01-18 RX ADMIN — SODIUM CHLORIDE: 0.9 INJECTION, SOLUTION INTRAVENOUS at 01:01

## 2022-01-18 RX ADMIN — SODIUM CHLORIDE, SODIUM LACTATE, POTASSIUM CHLORIDE, AND CALCIUM CHLORIDE: .6; .31; .03; .02 INJECTION, SOLUTION INTRAVENOUS at 07:01

## 2022-01-18 RX ADMIN — ROCURONIUM BROMIDE 10 MG: 10 INJECTION, SOLUTION INTRAVENOUS at 07:01

## 2022-01-18 RX ADMIN — OXYCODONE HYDROCHLORIDE 5 MG: 5 TABLET ORAL at 08:01

## 2022-01-18 RX ADMIN — PIPERACILLIN AND TAZOBACTAM 3.38 G: 3; .375 INJECTION, POWDER, LYOPHILIZED, FOR SOLUTION INTRAVENOUS; PARENTERAL at 01:01

## 2022-01-18 RX ADMIN — LIDOCAINE HYDROCHLORIDE 100 MG: 20 INJECTION, SOLUTION EPIDURAL; INFILTRATION; INTRACAUDAL; PERINEURAL at 06:01

## 2022-01-18 RX ADMIN — FENTANYL CITRATE 50 MCG: 50 INJECTION INTRAMUSCULAR; INTRAVENOUS at 06:01

## 2022-01-18 RX ADMIN — ROCURONIUM BROMIDE 5 MG: 10 INJECTION, SOLUTION INTRAVENOUS at 06:01

## 2022-01-18 RX ADMIN — LIDOCAINE HYDROCHLORIDE 3 ML: 20 JELLY TOPICAL at 06:01

## 2022-01-18 RX ADMIN — IOHEXOL 100 ML: 350 INJECTION, SOLUTION INTRAVENOUS at 11:01

## 2022-01-18 RX ADMIN — SCOPOLAMINE 1 PATCH: 1 PATCH, EXTENDED RELEASE TRANSDERMAL at 05:01

## 2022-01-18 RX ADMIN — SODIUM CHLORIDE: 0.9 INJECTION, SOLUTION INTRAVENOUS at 02:01

## 2022-01-18 RX ADMIN — SUCCINYLCHOLINE CHLORIDE 120 MG: 20 INJECTION, SOLUTION INTRAMUSCULAR; INTRAVENOUS at 06:01

## 2022-01-18 RX ADMIN — ACETAMINOPHEN 1000 MG: 10 INJECTION, SOLUTION INTRAVENOUS at 05:01

## 2022-01-18 RX ADMIN — ONDANSETRON 4 MG: 2 INJECTION INTRAMUSCULAR; INTRAVENOUS at 06:01

## 2022-01-18 RX ADMIN — FAMOTIDINE 20 MG: 10 INJECTION, SOLUTION INTRAVENOUS at 05:01

## 2022-01-18 RX ADMIN — CEFOXITIN SODIUM 2 G: 2 INJECTION, SOLUTION INTRAVENOUS at 06:01

## 2022-01-18 RX ADMIN — ACETAMINOPHEN 650 MG: 325 TABLET, FILM COATED ORAL at 11:01

## 2022-01-18 RX ADMIN — ALPRAZOLAM 0.5 MG: 0.5 TABLET ORAL at 10:01

## 2022-01-18 RX ADMIN — SUGAMMADEX 200 MG: 100 INJECTION, SOLUTION INTRAVENOUS at 07:01

## 2022-01-18 RX ADMIN — ONDANSETRON 4 MG: 2 INJECTION INTRAMUSCULAR; INTRAVENOUS at 08:01

## 2022-01-18 NOTE — CONSULTS
GENERAL SURGERY  INPATIENT CONSULT    REASON FOR CONSULT/ADMISSION: acute appendicitis     HPI: Jenny Bailey is a 36 y.o. female with recent onset of abd pain now localized in RLQ and nausea. Underwent labs which were grossly nml however CT showed evidence for acute appendicitis but no perforation. She was given abx and surgery consulted for evaluation. Pain improved with meds. Localized TTP onpalpation. No previous surgery.    I have reviewed the patient's chart including prior progress notes, procedures and testing.     ROS:    Review of Systems - General ROS: positive for  - malaise  Respiratory ROS: negative  Cardiovascular ROS: negative  Gastrointestinal ROS: positive for - abdominal pain and nausea/vomiting  Musculoskeletal ROS: negative      PROBLEM LIST:  Patient Active Problem List   Diagnosis    Non-smoker    Acute appendicitis         HISTORY  History reviewed. No pertinent past medical history.    Past Surgical History:   Procedure Laterality Date    CATARACT EXTRACTION      EYE SURGERY         Social History     Tobacco Use    Smoking status: Never Smoker    Smokeless tobacco: Never Used   Substance Use Topics    Alcohol use: Not Currently    Drug use: Never       Family History   Problem Relation Age of Onset    Coronary artery disease Maternal Grandmother     Depression Maternal Grandmother     Diabetes type II Maternal Grandmother     Breast cancer Paternal Grandmother     Pancreatic cancer Paternal Grandmother     Hyperlipidemia Mother     Drug abuse Father          MEDS:  Current Facility-Administered Medications on File Prior to Encounter   Medication Dose Route Frequency Provider Last Rate Last Admin    [COMPLETED] iohexoL (OMNIPAQUE 350) injection 100 mL  100 mL Intravenous ONCE PRN Paper Order   100 mL at 01/18/22 1140     Current Outpatient Medications on File Prior to Encounter   Medication Sig Dispense Refill    ALPRAZolam (XANAX) 0.5 MG tablet Take 1 tablet by mouth 2  (two) times a day.      ibuprofen (ADVIL,MOTRIN) 200 MG tablet Take 800 mg by mouth once as needed for Pain.      amoxicillin-clavulanate 875-125mg (AUGMENTIN) 875-125 mg per tablet Take 1 tablet by mouth 2 (two) times daily. 20 tablet 0    melatonin (MELATIN) Take 1 tablet (3 mg total) by mouth nightly.  0    ranitidine (ZANTAC) 150 MG tablet          ALLERGIES:  Review of patient's allergies indicates:  No Known Allergies      VITALS:  Temp:  [98.6 °F (37 °C)] 98.6 °F (37 °C)  Pulse:  [] 89  Resp:  [18] 18  SpO2:  [99 %-100 %] 100 %  BP: (115-122)/(78-88) 122/78    No intake/output data recorded.      PHYSICAL EXAM:  Physical Exam  NAD, AAOx3  Anicteric sclera  Supple neck, nml ROM  RRR, pulses nml  NLB  Abd soft, +TTP in RLQ, no rebound but localized guarding  No LE edema    LABS:  Lab Results   Component Value Date    WBC 9.10 01/18/2022    RBC 4.68 01/18/2022    HGB 10.7 (L) 01/18/2022    HCT 34.2 (L) 01/18/2022     01/18/2022     Lab Results   Component Value Date    GLU 96 01/18/2022     (L) 01/18/2022    K 3.5 01/18/2022    CL 98 01/18/2022    CO2 23 01/18/2022    BUN 6 01/18/2022    CREATININE 0.7 01/18/2022    CALCIUM 9.1 01/18/2022     Lab Results   Component Value Date    ALT 12 01/18/2022    AST 16 01/18/2022    ALKPHOS 74 01/18/2022    BILITOT 0.8 01/18/2022     No results found for: MG, PHOS    STUDIES:  Images and reports were personally reviewed.    CT ABDOMEN:  Partially visualized lung bases are clear.     Enlarged appendix with mucosal hyperemia, fluid-filled lumen, and moderate periappendiceal inflammatory fat stranding is characteristic of acute appendicitis. No evidence of perforation.     Intestines are otherwise unremarkable with enteric contrast reaching rectum without obstruction. No free intraperitoneal gas.     7 mm cyst lies superiorly in the right hepatic lobe, and liver is otherwise normal. Gallbladder, pancreas, spleen, adrenals, and kidneys are normal. Aorta  is normal.     No acute osseous abnormality.     CT PELVIS:  Bladder is normal. Uterus and ovaries are normal. Physiologic amount of free fluid lies in the pelvis.     IMPRESSION:  Acute appendicitis.      ASSESSMENT & PLAN:  36 y.o. female with acute appendicitis  - presentation and imaging consistent with appendicitis  - options reviewed including abx therapy vs surgical intervention   - risks of surgery including pain, bleeding, infection, scarring, negative appendectomy, leak, abscess, injury to other organ, etc. were reviewed, patient expressed understanding and agreed to proceed with surgical intervention  - to OR tonight for lap appy  - will be ok for [facility] -> reg diet post op  - I will write for post op pain regimen  - should be ok for d/c tomorrow AM if surgery is uneventful

## 2022-01-18 NOTE — ED PROVIDER NOTES
Encounter Date: 1/18/2022       History     Chief Complaint   Patient presents with    Abdominal Pain     Dxed appendicitis earlier today from out patient ct.      Patient here reported abdominal pain states onset of symptoms Monday is in the generalized lower abdominal pain states that she was examined today at urgent care her pain seem to be worse in the right lower quadrant on palpation she was sent for outpatient CT scan at [facility] which showed evidence of appendicitis she has had no previous abdominal surgeries no recent illness denies any nausea vomiting diarrhea no dysuria urgency or frequency        Review of patient's allergies indicates:  No Known Allergies  No past medical history on file.  No past surgical history on file.  Family History   Problem Relation Age of Onset    Coronary artery disease Maternal Grandmother     Depression Maternal Grandmother     Diabetes type II Maternal Grandmother     Breast cancer Paternal Grandmother     Pancreatic cancer Paternal Grandmother     Hyperlipidemia Mother     Drug abuse Father      Social History     Tobacco Use    Smoking status: Never Smoker    Smokeless tobacco: Never Used   Substance Use Topics    Drug use: Not Currently     Review of Systems   Constitutional: Negative for chills and fever.   Gastrointestinal: Positive for abdominal pain. Negative for blood in stool, diarrhea, nausea and vomiting.   Genitourinary: Negative for difficulty urinating, dysuria, flank pain and urgency.   All other systems reviewed and are negative.      Physical Exam     Initial Vitals   BP Pulse Resp Temp SpO2   01/18/22 1233 01/18/22 1233 01/18/22 1233 01/18/22 1323 01/18/22 1233   115/88 (!) 115 18 98.6 °F (37 °C) 99 %      MAP       --                Physical Exam    Constitutional: She appears well-developed and well-nourished. No distress.   HENT:   Head: Normocephalic and atraumatic.   Right Ear: External ear normal.   Left Ear: External ear normal.    Mouth/Throat: Oropharynx is clear and moist.   Eyes: Conjunctivae and EOM are normal. Pupils are equal, round, and reactive to light.   Neck: Neck supple.   Normal range of motion.  Cardiovascular: Normal rate, regular rhythm, normal heart sounds and intact distal pulses.   Pulmonary/Chest: Breath sounds normal. No respiratory distress.   Abdominal: Bowel sounds are normal. There is abdominal tenderness.   Musculoskeletal:         General: No edema. Normal range of motion.      Cervical back: Normal range of motion and neck supple.     Neurological: She is alert and oriented to person, place, and time. GCS score is 15. GCS eye subscore is 4. GCS verbal subscore is 5. GCS motor subscore is 6.   Skin: Skin is warm and dry. Capillary refill takes less than 2 seconds. No rash noted.   Psychiatric: She has a normal mood and affect. Her behavior is normal.         ED Course   Procedures  Labs Reviewed   CBC W/ AUTO DIFFERENTIAL - Abnormal; Notable for the following components:       Result Value    Hemoglobin 10.7 (*)     Hematocrit 34.2 (*)     MCV 73 (*)     MCH 22.9 (*)     MCHC 31.3 (*)     RDW 15.0 (*)     Gran % 74.6 (*)     Lymph % 14.6 (*)     All other components within normal limits   COMPREHENSIVE METABOLIC PANEL - Abnormal; Notable for the following components:    Sodium 133 (*)     All other components within normal limits   URINALYSIS, REFLEX TO URINE CULTURE - Abnormal; Notable for the following components:    Specific Gravity, UA >1.030 (*)     All other components within normal limits    Narrative:     Specimen Source->Urine   LIPASE   SARS-COV-2 RNA AMPLIFICATION, QUAL   POCT URINE PREGNANCY          Imaging Results    None          Medications   piperacillin-tazobactam 3.375 g in dextrose 5 % 50 mL IVPB (ready to mix system) (0 g Intravenous Stopped 1/18/22 1435)   0.9%  NaCl infusion ( Intravenous Stopped 1/18/22 1451)   0.9%  NaCl infusion ( Intravenous New Bag 1/18/22 1452)     Medical Decision  Making:   ED Management:  I discussed patient's findings with Dr. Gomes on-call for General surgery he will evaluate patient emergency department for operative management will keep patient NPO IV fluids Zosyn in the emergency department                      Clinical Impression:   Final diagnoses:  [K35.80] Acute appendicitis, unspecified acute appendicitis type                 Dominic Shay MD  01/18/22 1874

## 2022-01-18 NOTE — H&P
[facility] Medicine History & Physical Examination   Patient Name: Jenny Bailey  MRN: [id number]  Patient Class: OP- Observation   Admission Date: 1/18/2022 12:15 PM  Length of Stay: 0  Attending Physician:   Primary Care Provider: Kiet David MD  Face-to-Face encounter date: 01/18/2022  Code Status: Full Code  MPOA:  Chief Complaint: Abdominal Pain (Dxed appendicitis earlier today from out patient ct. )        Patient information was obtained from patient, past medical records and ER records.   HISTORY OF PRESENT ILLNESS:   Jenny Bailey is a 36 y.o. old  female who  has no past medical history on file.. The patient presented to [facility] on 1/18/2022 with a primary complaint of Abdominal Pain (Dxed appendicitis earlier today from out patient ct. )  .   36-year-old  female presents emergency room with right lower quadrant abdominal pain.  This patient has no significant prior medical history.  The patient states yesterday she began to have diffuse abdominal pain and then later in the day it began in her right lower quadrant.  Today she went to a local urgent care clinic and had a CT performed which revealed an appendicitis.  She came in today for further management.  She endorse subjective chills but no fever.  No nausea vomiting diarrhea no constipation no chest pain.  She describes her pain as a tight cramping sensation with no alleviating or exacerbating factors    REVIEW OF SYSTEMS:   10 Point Review of System was performed and was found to be negative except for that mentioned already in the HPI and   Review of Systems (Negative unless checked off)  Review of Systems   Constitutional: Positive for chills.   HENT: Negative.    Eyes: Negative.    Respiratory: Negative.    Cardiovascular: Negative.    Gastrointestinal: Positive for abdominal pain and nausea.   Genitourinary: Negative.    Musculoskeletal: Negative.    Skin: Negative.    Neurological: Negative.   "  Endo/Heme/Allergies: Negative.    Psychiatric/Behavioral: Negative.            PAST MEDICAL HISTORY:   History reviewed. No pertinent past medical history.    PAST SURGICAL HISTORY:     Past Surgical History:   Procedure Laterality Date    CATARACT EXTRACTION      EYE SURGERY         ALLERGIES:   Patient has no known allergies.    FAMILY HISTORY:     Family History   Problem Relation Age of Onset    Coronary artery disease Maternal Grandmother     Depression Maternal Grandmother     Diabetes type II Maternal Grandmother     Breast cancer Paternal Grandmother     Pancreatic cancer Paternal Grandmother     Hyperlipidemia Mother     Drug abuse Father        SOCIAL HISTORY:     Social History     Tobacco Use    Smoking status: Never Smoker    Smokeless tobacco: Never Used   Substance Use Topics    Alcohol use: Not Currently        Social History     Substance and Sexual Activity   Sexual Activity Yes    Partners: Female        HOME MEDICATIONS:     Prior to Admission medications    Medication Sig Start Date End Date Taking? Authorizing Provider   ALPRAZolam (XANAX) 0.5 MG tablet Take 1 tablet by mouth 2 (two) times a day. 12/23/21  Yes Historical Provider   ibuprofen (ADVIL,MOTRIN) 200 MG tablet Take 800 mg by mouth once as needed for Pain.   Yes Historical Provider   amoxicillin-clavulanate 875-125mg (AUGMENTIN) 875-125 mg per tablet Take 1 tablet by mouth 2 (two) times daily. 10/1/19   JASMIN Worrell   melatonin (MELATIN) Take 1 tablet (3 mg total) by mouth nightly. 10/1/19   JASMIN Worrell   ranitidine (ZANTAC) 150 MG tablet  1/1/19   Historical Provider         PHYSICAL EXAM:   /78   Pulse 89   Temp 98.6 °F (37 °C)   Resp 18   Ht 5' 3" (1.6 m)   Wt 95 kg (209 lb 7 oz)   SpO2 100%   BMI 37.10 kg/m²   Vitals Reviewed  General appearance: Well-developed, well-nourished female in no apparent distress.  Skin: No Rash.   Neuro: Motor and sensory exams grossly intact. Good tone. " Power in all 4 extremities 5/5.   HENT: Atraumatic head. Moist mucous membranes of oral cavity.  Eyes: Normal extraocular movements.   Neck: Supple. No evidence of lymphadenopathy. No thyroidomegaly.  Lungs: Clear to auscultation bilaterally. No wheezing present.   Heart: Regular rate and rhythm. S1 and S2 present with no murmurs/gallop/rub. No pedal edema. No JVD present.   Abdomen: Soft, non-distended, non-tender. No rebound tenderness/guarding. No masses or organomegaly. Bowel sounds are normal. Bladder is not palpable.   Extremities: No cyanosis, clubbing, or edema.  Psych/mental status: Alert and oriented. Cooperative. Responds appropriately to questions.   EMERGENCY DEPARTMENT LABS AND IMAGING:   Following labs were Reviewed   Recent Labs   Lab 01/18/22  1250   WBC 9.10   HGB 10.7*   HCT 34.2*      CALCIUM 9.1   ALBUMIN 4.3   PROT 7.5   *   K 3.5   CO2 23   CL 98   BUN 6   CREATININE 0.7   ALKPHOS 74   ALT 12   AST 16   BILITOT 0.8         BMP:   Recent Labs   Lab 01/18/22  1250   GLU 96   *   K 3.5   CL 98   CO2 23   BUN 6   CREATININE 0.7   CALCIUM 9.1   , CMP   Recent Labs   Lab 01/18/22  1250   *   K 3.5   CL 98   CO2 23   GLU 96   BUN 6   CREATININE 0.7   CALCIUM 9.1   PROT 7.5   ALBUMIN 4.3   BILITOT 0.8   ALKPHOS 74   AST 16   ALT 12   ANIONGAP 12   ESTGFRAFRICA >60.0   EGFRNONAA >60.0   , CBC   Recent Labs   Lab 01/18/22  1250   WBC 9.10   HGB 10.7*   HCT 34.2*      , INR No results found for: INR, PROTIME, Lipid Panel   Lab Results   Component Value Date    CHOL 206 (H) 10/02/2019    HDL 48 10/02/2019    LDLCALC 134 (H) 10/02/2019    TRIG 118 10/02/2019   , Troponin No results for input(s): TROPONINI in the last 168 hours., A1C: No results for input(s): HGBA1C in the last 4320 hours. and All labs within the past 24 hours have been reviewed  Microbiology Results (last 7 days)     ** No results found for the last 168 hours. **        No orders to display     CT Abdomen  Pelvis With Contrast    Result Date: 1/18/2022  CMS MANDATED QUALITY DATA - CT RADIATION - 436 All CT scans at this facility utilize dose modulation, iterative reconstruction, and/or weight based dosing when appropriate to reduce radiation dose to as low as reasonably achievable. Reason: r10.31 RLQ PAIN AND VOMITING X 2 DAYS/; 100 OMNI TECHNIQUE: CT abdomen and pelvis with 100 mL Omnipaque 350. COMPARISON: None CT ABDOMEN: Partially visualized lung bases are clear. Enlarged appendix with mucosal hyperemia, fluid-filled lumen, and moderate periappendiceal inflammatory fat stranding is characteristic of acute appendicitis. No evidence of perforation. Intestines are otherwise unremarkable with enteric contrast reaching rectum without obstruction. No free intraperitoneal gas. 7 mm cyst lies superiorly in the right hepatic lobe, and liver is otherwise normal. Gallbladder, pancreas, spleen, adrenals, and kidneys are normal. Aorta is normal. No acute osseous abnormality. CT PELVIS: Bladder is normal. Uterus and ovaries are normal. Physiologic amount of free fluid lies in the pelvis. IMPRESSION: Acute appendicitis. RESULT NOTIFICATION: These observations were discussed by Dr. Newton with, and acknowledged by, ERIN PERRY at 1150. Patient was instructed to go to the emergency department. Electronically signed by:  Aubrey Newton MD  1/18/2022 12:00 PM CST Workstation: [id number]      I personally reviewed and agree with the radiologist's findings  ASSESSMENT & PLAN:   Jenny Bailey is a 36 y.o. female admitted for    1. Acute appendicitis.   - IV Antibiotics with Zosyn  - IV Fluids  - Pain management  - Surgery Consult Dr. Gomes    DVT Prophylaxis: will be placed on SCDs  for DVT prophylaxis and will be advised to be as mobile as possible and sit in a chair as tolerated.   _____________________________________________________________  Face-to-Face encounter date: 01/18/2022  Encounter included review of the  medical records, interviewing and examining the patient face-to-face, discussion with family and other health care providers including emergency medicine physician, admission orders, interpreting lab/test results and formulating a plan of care.   Medical Decision Making during this encounter was  [_] Low Complexity  [_] Moderate Complexity  [x] High Complexity  _________________________________________________________________________________    INPATIENT LIST OF MEDICATIONS     Current Facility-Administered Medications:     acetaminophen tablet 650 mg, 650 mg, Oral, Q8H PRN, Anabel Bailey NP    ALPRAZolam tablet 0.5 mg, 0.5 mg, Oral, BID, Anabel Bailey NP    [START ON 1/19/2022] famotidine (PF) injection 20 mg, 20 mg, Intravenous, Daily, Anabel Bailey NP    HYDROmorphone injection 1 mg, 1 mg, Intravenous, Q6H PRN, Anabel Bailey NP    lactated ringers infusion, 1,000 mL, Intravenous, Daily, Anabel Bailey NP    melatonin tablet 6 mg, 6 mg, Oral, Nightly PRN, Anabel Bailey NP    ondansetron injection 4 mg, 4 mg, Intravenous, Q8H PRN, Anabel Bailey NP    sodium chloride 0.9% flush 10 mL, 10 mL, Intravenous, PRN, Anabel Bailey NP    Current Outpatient Medications:     ALPRAZolam (XANAX) 0.5 MG tablet, Take 1 tablet by mouth 2 (two) times a day., Disp: , Rfl:     ibuprofen (ADVIL,MOTRIN) 200 MG tablet, Take 800 mg by mouth once as needed for Pain., Disp: , Rfl:     amoxicillin-clavulanate 875-125mg (AUGMENTIN) 875-125 mg per tablet, Take 1 tablet by mouth 2 (two) times daily., Disp: 20 tablet, Rfl: 0    melatonin (MELATIN), Take 1 tablet (3 mg total) by mouth nightly., Disp: , Rfl: 0    ranitidine (ZANTAC) 150 MG tablet, , Disp: , Rfl:       Scheduled Meds:  Continuous Infusions:  PRN Meds:.      Anabel Bailey  [facility] Hospitalist NP  01/18/2022

## 2022-01-18 NOTE — ANESTHESIA PREPROCEDURE EVALUATION
01/18/2022  Jenny Bailey is a 36 y.o., female.    Patient Active Problem List   Diagnosis    Non-smoker       No past surgical history on file.     Tobacco Use:  The patient  reports that she has never smoked. She has never used smokeless tobacco.     No results found for this or any previous visit.     Imaging Results    None          Lab Results   Component Value Date    WBC 6.3 10/02/2019    HGB 12.0 10/02/2019    HCT 39.7 10/02/2019    MCV 82 10/02/2019     10/02/2019     BMP  Lab Results   Component Value Date     10/02/2019    K 4.5 10/02/2019     10/02/2019    CO2 21 10/02/2019    BUN 11 10/02/2019    CREATININE 0.79 10/02/2019    CALCIUM 9.7 10/02/2019    GLU 95 10/02/2019     CT Abdomen Pelvis With Contrast    Status: Final result       [facility] Results Release    [employer]Â® Status: Active  Results Release       PACS Images for [facility] [place] Viewer     Show images for CT Abdomen Pelvis With Contrast    CT Abdomen Pelvis With Contrast  Order: [id number]   Status: Final result       Visible to patient: Yes (not seen)       Next appt: None       Dx: Abdominal pain       0 Result Notes      Details    Reading Physician Reading Date Result Priority   Aubrey Newton MD  [phone] 1/18/2022      Narrative & Impression  CMS MANDATED QUALITY DATA - CT RADIATION - 436     All CT scans at this facility utilize dose modulation, iterative reconstruction, and/or weight based dosing when appropriate to reduce radiation dose to as low as reasonably achievable.           Reason: r10.31 RLQ PAIN AND VOMITING X 2 DAYS/; 100 OMNI     TECHNIQUE: CT abdomen and pelvis with 100 mL Omnipaque 350.     COMPARISON: None     CT ABDOMEN:  Partially visualized lung bases are clear.     Enlarged appendix with mucosal hyperemia, fluid-filled lumen, and moderate periappendiceal inflammatory fat stranding is  characteristic of acute appendicitis. No evidence of perforation.     Intestines are otherwise unremarkable with enteric contrast reaching rectum without obstruction. No free intraperitoneal gas.     7 mm cyst lies superiorly in the right hepatic lobe, and liver is otherwise normal. Gallbladder, pancreas, spleen, adrenals, and kidneys are normal. Aorta is normal.     No acute osseous abnormality.     CT PELVIS:  Bladder is normal. Uterus and ovaries are normal. Physiologic amount of free fluid lies in the pelvis.     IMPRESSION:  Acute appendicitis.           No results found for this or any previous visit.        Anesthesia Evaluation    I have reviewed the Patient Summary Reports.    I have reviewed the Nursing Notes. I have reviewed the NPO Status.   I have reviewed the Medications.     Review of Systems  Anesthesia Hx:  No previous Anesthesia Denies Hx of Anesthetic complications  Denies Family Hx of Anesthesia complications.   Denies Personal Hx of Anesthesia complications.   Social:  Non-Smoker    Hematology/Oncology:  Hematology Normal   Oncology Normal     EENT/Dental:  EENT/Dental Normal Eyes: Visual Impairment Has Left, Right and S/P Extraction - Left Catarract    Cardiovascular:   Valvular problems/Murmurs, MVP    Pulmonary:  Pulmonary Normal    Renal/:  Renal/ Normal     Hepatic/GI:  Hepatic/GI Normal Acute appendicitis   Musculoskeletal:  Musculoskeletal Normal    Neurological:  Neurology Normal    Endocrine:  Endocrine Normal    Dermatological:  Skin Normal    Psych:  Psychiatric Normal           Physical Exam  General:  Well nourished, Obesity    Airway/Jaw/Neck:  Airway Findings: Mouth Opening: Normal Tongue: Normal  General Airway Assessment: Adult  Mallampati: I  TM Distance: Normal, at least 6 cm  Jaw/Neck Findings:  Neck ROM: Normal ROM      Dental:  Dental Findings: Upper front caps    Chest/Lungs:  Chest/Lungs Findings: Clear to auscultation, Normal Respiratory Rate      Heart/Vascular:  Heart Findings: Rate: Normal  Rhythm: Regular Rhythm  Sounds: Normal        Mental Status:  Mental Status Findings:  Cooperative, Alert and Oriented         Anesthesia Plan  Type of Anesthesia, risks & benefits discussed:  Anesthesia Type:  general    Patient's Preference:   Plan Factors:          Intra-op Monitoring Plan: standard ASA monitors  Intra-op Monitoring Plan Comments:   Post Op Pain Control Plan: multimodal analgesia, IV/PO Opioids PRN and per primary service following discharge from PACU  Post Op Pain Control Plan Comments:     Induction:   IV and rapid sequence  Beta Blocker:  Patient is not currently on a Beta-Blocker (No further documentation required).       Informed Consent: Patient understands risks and agrees with Anesthesia plan.  Questions answered. Anesthesia consent signed with patient.  ASA Score: 3     Day of Surgery Review of History & Physical:        Anesthesia Plan Notes:     GETA / RSI  Benadryl 6.25 mg iv   Zofran 4 mg iv  Pepcid 20 mg iv   Ofirmev 1000 mg iv  Scopolamine Patch  Sugammadex         Ready For Surgery From Anesthesia Perspective.

## 2022-01-18 NOTE — ED NOTES
Adult Physical Assessment  LOC: Jenny Bailey, 36 y.o. female verified via two identifiers.  The patient is awake, alert, oriented and speaking appropriately at this time.  APPEARANCE: Patient resting comfortably and appears to be in no acute distress at this time. Patient is clean and well groomed, patient's clothing is properly fastened.  SKIN:The skin is warm and dry, color consistent with ethnicity, patient has normal skin turgor and moist mucus membranes, skin intact, no breakdown or brusing noted.  MUSCULOSKELETAL: Patient moving all extremities well, no obvious swelling or deformities noted.  RESPIRATORY: Airway is open and patent, respirations are spontaneous, patient has a normal effort and rate, no accessory muscle use noted.  CARDIAC: Patient has a normal rate and rhythm, no periphreal edema noted in any extremity, capillary refill < 3 seconds in all extremities  ABDOMEN: Soft and non tender to palpation x 3, RLQ tender to palpitation-Pt states she has been having pain since Monday morning, went to urgent got a outpatient ct and sent here., no abdominal distention noted. Bowel sounds present in all four quadrants.  NEUROLOGIC: Eyes open spontaneously, behavior appropriate to situation, follows commands, facial expression symmetrical, bilateral hand grasp equal and even, purposeful motor response noted, normal sensation in all extremities when touched with a finger.

## 2022-01-19 VITALS
HEART RATE: 96 BPM | HEIGHT: 63 IN | RESPIRATION RATE: 18 BRPM | TEMPERATURE: 98 F | OXYGEN SATURATION: 97 % | WEIGHT: 209.44 LBS | BODY MASS INDEX: 37.11 KG/M2 | SYSTOLIC BLOOD PRESSURE: 103 MMHG | DIASTOLIC BLOOD PRESSURE: 54 MMHG

## 2022-01-19 PROBLEM — K76.89 HEPATIC CYST: Chronic | Status: ACTIVE | Noted: 2022-01-19

## 2022-01-19 PROBLEM — D50.9 MICROCYTIC ANEMIA: Status: ACTIVE | Noted: 2022-01-19

## 2022-01-19 PROBLEM — E66.9 CLASS 2 OBESITY IN ADULT: Chronic | Status: ACTIVE | Noted: 2022-01-19

## 2022-01-19 LAB
ANION GAP SERPL CALC-SCNC: 9 MMOL/L (ref 8–16)
BASOPHILS # BLD AUTO: 0.03 K/UL (ref 0–0.2)
BASOPHILS NFR BLD: 0.3 % (ref 0–1.9)
BUN SERPL-MCNC: <5 MG/DL (ref 6–20)
CALCIUM SERPL-MCNC: 8.8 MG/DL (ref 8.7–10.5)
CHLORIDE SERPL-SCNC: 101 MMOL/L (ref 95–110)
CO2 SERPL-SCNC: 26 MMOL/L (ref 23–29)
CREAT SERPL-MCNC: 0.8 MG/DL (ref 0.5–1.4)
DIFFERENTIAL METHOD: ABNORMAL
EOSINOPHIL # BLD AUTO: 0.1 K/UL (ref 0–0.5)
EOSINOPHIL NFR BLD: 1.2 % (ref 0–8)
ERYTHROCYTE [DISTWIDTH] IN BLOOD BY AUTOMATED COUNT: 15 % (ref 11.5–14.5)
EST. GFR  (AFRICAN AMERICAN): >60 ML/MIN/1.73 M^2
EST. GFR  (NON AFRICAN AMERICAN): >60 ML/MIN/1.73 M^2
GLUCOSE SERPL-MCNC: 99 MG/DL (ref 70–110)
HCT VFR BLD AUTO: 35.5 % (ref 37–48.5)
HGB BLD-MCNC: 10.8 G/DL (ref 12–16)
IMM GRANULOCYTES # BLD AUTO: 0.03 K/UL (ref 0–0.04)
IMM GRANULOCYTES NFR BLD AUTO: 0.3 % (ref 0–0.5)
LYMPHOCYTES # BLD AUTO: 1.3 K/UL (ref 1–4.8)
LYMPHOCYTES NFR BLD: 14.7 % (ref 18–48)
MCH RBC QN AUTO: 23.4 PG (ref 27–31)
MCHC RBC AUTO-ENTMCNC: 30.4 G/DL (ref 32–36)
MCV RBC AUTO: 77 FL (ref 82–98)
MONOCYTES # BLD AUTO: 0.7 K/UL (ref 0.3–1)
MONOCYTES NFR BLD: 7.6 % (ref 4–15)
NEUTROPHILS # BLD AUTO: 6.5 K/UL (ref 1.8–7.7)
NEUTROPHILS NFR BLD: 75.9 % (ref 38–73)
NRBC BLD-RTO: 0 /100 WBC
PLATELET # BLD AUTO: 229 K/UL (ref 150–450)
PMV BLD AUTO: 13 FL (ref 9.2–12.9)
POTASSIUM SERPL-SCNC: 3.5 MMOL/L (ref 3.5–5.1)
RBC # BLD AUTO: 4.62 M/UL (ref 4–5.4)
SODIUM SERPL-SCNC: 136 MMOL/L (ref 136–145)
WBC # BLD AUTO: 8.6 K/UL (ref 3.9–12.7)

## 2022-01-19 PROCEDURE — 96375 TX/PRO/DX INJ NEW DRUG ADDON: CPT

## 2022-01-19 PROCEDURE — 25000003 PHARM REV CODE 250: Performed by: NURSE PRACTITIONER

## 2022-01-19 PROCEDURE — 25000003 PHARM REV CODE 250: Performed by: INTERNAL MEDICINE

## 2022-01-19 PROCEDURE — 36415 COLL VENOUS BLD VENIPUNCTURE: CPT | Performed by: NURSE PRACTITIONER

## 2022-01-19 PROCEDURE — 85025 COMPLETE CBC W/AUTO DIFF WBC: CPT | Performed by: NURSE PRACTITIONER

## 2022-01-19 PROCEDURE — G0378 HOSPITAL OBSERVATION PER HR: HCPCS

## 2022-01-19 PROCEDURE — 80048 BASIC METABOLIC PNL TOTAL CA: CPT | Performed by: NURSE PRACTITIONER

## 2022-01-19 PROCEDURE — 63600175 PHARM REV CODE 636 W HCPCS: Performed by: NURSE PRACTITIONER

## 2022-01-19 PROCEDURE — 99900035 HC TECH TIME PER 15 MIN (STAT)

## 2022-01-19 PROCEDURE — 94799 UNLISTED PULMONARY SVC/PX: CPT

## 2022-01-19 RX ORDER — ACETAMINOPHEN 500 MG
1000 TABLET ORAL 3 TIMES DAILY
Qty: 18 TABLET | Refills: 0 | Status: SHIPPED | OUTPATIENT
Start: 2022-01-19 | End: 2022-01-22

## 2022-01-19 RX ORDER — OXYCODONE HYDROCHLORIDE 5 MG/1
5 TABLET ORAL EVERY 6 HOURS PRN
Qty: 20 TABLET | Refills: 0 | Status: SHIPPED | OUTPATIENT
Start: 2022-01-19 | End: 2022-01-24

## 2022-01-19 RX ORDER — IBUPROFEN 800 MG/1
800 TABLET ORAL 3 TIMES DAILY
Qty: 9 TABLET | Refills: 0 | Status: SHIPPED | OUTPATIENT
Start: 2022-01-19 | End: 2022-01-22

## 2022-01-19 RX ADMIN — ALPRAZOLAM 0.5 MG: 0.5 TABLET ORAL at 09:01

## 2022-01-19 RX ADMIN — HYDROMORPHONE HYDROCHLORIDE 1 MG: 1 INJECTION, SOLUTION INTRAMUSCULAR; INTRAVENOUS; SUBCUTANEOUS at 10:01

## 2022-01-19 RX ADMIN — ACETAMINOPHEN 650 MG: 325 TABLET, FILM COATED ORAL at 10:01

## 2022-01-19 NOTE — TRANSFER OF CARE
"Anesthesia Transfer of Care Note    Patient: Jenny Bailey    Procedure(s) Performed: Procedure(s) (LRB):  APPENDECTOMY, LAPAROSCOPIC (N/A)    Patient location: PACU    Anesthesia Type: general    Transport from OR: Transported from OR on 2-3 L/min O2 by NC with adequate spontaneous ventilation    Post pain: adequate analgesia    Post assessment: no apparent anesthetic complications    Post vital signs: stable    Level of consciousness: awake and alert    Nausea/Vomiting: no nausea/vomiting    Complications: none    Transfer of care protocol was followed      Last vitals:   Visit Vitals  /69   Pulse 88   Temp 37 °C (98.6 °F)   Resp 18   Ht 5' 3" (1.6 m)   Wt 95 kg (209 lb 7 oz)   SpO2 100%   BMI 37.10 kg/m²     "

## 2022-01-19 NOTE — ANESTHESIA POSTPROCEDURE EVALUATION
Anesthesia Post Evaluation    Patient: Jenny Bailey    Procedure(s) Performed: Procedure(s) (LRB):  APPENDECTOMY, LAPAROSCOPIC (N/A)    Final Anesthesia Type: general      Patient location during evaluation: PACU  Patient participation: Yes- Able to Participate  Level of consciousness: awake and alert, oriented and awake  Post-procedure vital signs: reviewed and stable  Pain management: adequate  Airway patency: patent    PONV status at discharge: No PONV  Anesthetic complications: no      Cardiovascular status: blood pressure returned to baseline, hemodynamically stable and stable  Respiratory status: unassisted, spontaneous ventilation and room air  Hydration status: euvolemic  Follow-up not needed.          Vitals Value Taken Time   /66 01/18/22 2045   Temp 36.5 °C (97.7 °F) 01/18/22 2015   Pulse 82 01/18/22 2045   Resp 16 01/18/22 2045   SpO2 100 % 01/18/22 2045         No case tracking events are documented in the log.      Pain/Ashley Score: Pain Rating Prior to Med Admin: 2 (1/18/2022  8:12 PM)  Ashley Score: 10 (1/18/2022  8:45 PM)

## 2022-01-19 NOTE — CARE UPDATE
01/19/22 0743   Incentive Spirometer   $ Incentive Spirometer Charges done with encouragement;proper technique demonstrated   Incentive Spirometer Predicted Level (mL) 2000   Administration (IS) instruction provided, initial   Number of Repetitions (IS) 10   Level Incentive Spirometer (mL) 2500   Patient Tolerance (IS) good   Respiratory Evaluation   $ Care Plan Tech Time 15 min

## 2022-01-19 NOTE — PLAN OF CARE
[facility]  Initial Discharge Assessment       Primary Care Provider: Kiet David MD    Admission Diagnosis: Acute appendicitis, unspecified acute appendicitis type [K35.80]    Admission Date: 1/18/2022  Expected Discharge Date: 1/19/2022    Discharge Barriers Identified: None    Payor: BLUE CROSS BLUE SHIELD / Plan: [employer] HMO / Product Type: HMO /     Extended Emergency Contact Information  Primary Emergency Contact: Jenny Toribio  Mobile Phone: [phone]  Relation: Mother  Preferred language: English   needed? No    Discharge Plan A: Home  Discharge Plan B: Home      [id number] - [place], [place] - [address]  [address] [place] [address]  Phone: [phone] Fax: [phone]    [id number] - [place] - [address]  [address]  [place] [address]  Phone: [phone] Fax: [phone]      Initial Assessment (most recent)     Adult Discharge Assessment - 01/19/22 1129        Discharge Assessment    Assessment Type Discharge Planning Assessment     Confirmed/corrected address, phone number and insurance Yes     Confirmed Demographics Correct on Facesheet     Source of Information patient     When was your last doctors appointment? 11/24/21     Reason For Admission Appendicitis     Lives With child(maty), adult     Facility Arrived From: home     Do you expect to return to your current living situation? Yes     Do you have help at home or someone to help you manage your care at home? Yes     Who are your caregiver(s) and their phone number(s)? Jenny Catarino [phone]     Prior to hospitilization cognitive status: Alert/Oriented     Current cognitive status: Alert/Oriented     Walking or Climbing Stairs Difficulty none     Dressing/Bathing Difficulty none     Equipment Currently Used at Home none     Readmission within 30 days? No     Patient currently being followed by outpatient case management? No     Do you currently have service(s) that  help you manage your care at home? No     Do you take prescription medications? Yes     Do you have prescription coverage? Yes     Coverage BCBS     Do you have any problems affording any of your prescribed medications? No     Is the patient taking medications as prescribed? yes     Who is going to help you get home at discharge? Jenny Toribio [phone]     How do you get to doctors appointments? car, drives self     Are you on dialysis? No     Do you take coumadin? No     Discharge Plan A Home     Discharge Plan B Home     DME Needed Upon Discharge  none     Discharge Plan discussed with: Patient     Discharge Barriers Identified None

## 2022-01-19 NOTE — HPI
Jenny Bailey is a 36 y.o. old  female who  has no past medical history on file.. The patient presented to [facility] on 1/18/2022 with a primary complaint of Abdominal Pain (Dxed appendicitis earlier today from out patient ct. )  .   36-year-old  female presents emergency room with right lower quadrant abdominal pain.  This patient has no significant prior medical history.  The patient states yesterday she began to have diffuse abdominal pain and then later in the day it began in her right lower quadrant.  Today she went to a local urgent care clinic and had a CT performed which revealed an appendicitis.  She came in today for further management.  She endorse subjective chills but no fever.  No nausea vomiting diarrhea no constipation no chest pain.  She describes her pain as a tight cramping sensation with no alleviating or exacerbating factors

## 2022-01-19 NOTE — DISCHARGE SUMMARY
[facility] Medicine  Discharge Summary      Patient Name: Jenny Bailey  MRN: [id number]  Patient Class: OP- Observation  Admission Date: 1/18/2022  Hospital Length of Stay: 0 days  Discharge Date and Time:  01/19/2022 11:21 AM  Attending Physician: Tammy Field MD   Discharging Provider: Tammy Field MD  Primary Care Provider: Kiet David MD      HPI:   Jenny Bailey is a 36 y.o. old  female who  has no past medical history on file.. The patient presented to [facility] on 1/18/2022 with a primary complaint of Abdominal Pain (Dxed appendicitis earlier today from out patient ct. )  .   36-year-old  female presents emergency room with right lower quadrant abdominal pain.  This patient has no significant prior medical history.  The patient states yesterday she began to have diffuse abdominal pain and then later in the day it began in her right lower quadrant.  Today she went to a local urgent care clinic and had a CT performed which revealed an appendicitis.  She came in today for further management.  She endorse subjective chills but no fever.  No nausea vomiting diarrhea no constipation no chest pain.  She describes her pain as a tight cramping sensation with no alleviating or exacerbating factors      Procedure(s) (LRB):  APPENDECTOMY, LAPAROSCOPIC (N/A)      Hospital Course:   Patient was admitted with right lower abdominal pain with outpatient CT scan enlarged appendix, fluid filled with moderate surrounding inflammation concerning for acute appendicitis without any perforation.  Also incidentally found to have a 7 mm right hepatic cyst.  She was referred to the ED, afebrile, no leukocytosis, stable vitals, lipase 25, LFTs within normal, UA negative.  Seen by General surgery and underwent laparoscopic appendectomy on 01/18 and was monitored overnight postprocedure.  On 01/19 diet was slowly advanced and tolerating, discomfort over laparoscopic incision,  different from presenting pain, no bowel movement yet, otherwise feels well, cleared by consultant for discharge and feels ready for discharge.  Discharge plan including medications, follow-up as well as strict return precautions were reviewed.  Review course appendectomy restrictions.  No objection to discharge home all questions were addressed.  Communicated with nursing on day of discharge.    Discharge examination  Lying in bed, alert and oriented, on room air, abdomen nondistended, laparoscopic incisions well approximated       Goals of Care Treatment Preferences:  Code Status: Full Code      Consults:   Consults (From admission, onward)        Status Ordering Provider     Inpatient consult to Hospitalist  Once        Provider:  MD Lamonte Harding CLINT S. JR          No new Assessment & Plan notes have been filed under this hospital service since the last note was generated.  Service: Hospital Medicine    Final Active Diagnoses:    Diagnosis Date Noted POA    PRINCIPAL PROBLEM:  Microcytic anemia [D50.9] 01/19/2022 Yes    Hepatic cyst, right [K76.89] 01/19/2022 Yes     Chronic    Class 2 obesity in adult [E66.9] 01/19/2022 Yes     Chronic    Acute appendicitis [K35.80] 01/18/2022 Yes      Problems Resolved During this Admission:       Discharged Condition: good    Disposition: Home or Self Care    Follow Up:   Follow-up Information     Matthew Gomes Jr, MD In 2 weeks.    Specialty: General Surgery  Contact information:  [address] [address] [phone]             Kiet David MD. Schedule an appointment as soon as possible for a visit in 1 week.    Specialty: Internal Medicine  Why: follow up  Contact information:  [address] [place] [id number]  [phone]                       Patient Instructions:      Diet Adult Regular     Ice to affected area     Notify your health care provider if you experience any of the following:   temperature >100.4     Notify your health care provider if you experience any of the following:  persistent nausea and vomiting or diarrhea     Notify your health care provider if you experience any of the following:  severe uncontrolled pain     Notify your health care provider if you experience any of the following:  redness, tenderness, or signs of infection (pain, swelling, redness, odor or green/yellow discharge around incision site)     Notify your health care provider if you experience any of the following:  worsening rash     Notify your health care provider if you experience any of the following:  increased confusion or weakness     Change dressing (specify)   Order Comments: Post-Operative Wound Care    If a surgical glue has been placed over your incisions, please leave the glue in place and do not attempt to remove it.  It is ok to shower using mild soap and water over the incisions the day after your procedure. Pat dry your incisions. Do not soak in a bathtub or other body of water for 2 weeks or until cleared by your surgeon.     If a gauze bandage has been taped down over your incision or hernia site, please leave in place for 2 days. It is ok to carefully shower around the incision but please avoid getting the tape or adhesive dressing saturated. After 2 days gently remove the bandage. Once the bandage is removed you may gently wash over the incision in the shower using soap and water then pat dry. Do not soak in a bathtub or other body of water for 2 weeks or until cleared by your surgeon.     If you noticed redness, swelling, fever, increasing pain or significant drainage from your wound please call/message the office or the 24 hr nurse hotline after hours.     Lifting restrictions   Order Comments: Please avoid lifting greater than 20 lb, straining, strenuous activity for four weeks.     Other restrictions (specify):   Order Comments: No lifting more than 10 lb until seen by surgery in follow-up      Notify your health care provider if you experience any of the following:  temperature >100.4     Notify your health care provider if you experience any of the following:  persistent nausea and vomiting or diarrhea     Notify your health care provider if you experience any of the following:  severe uncontrolled pain     Shower on day dressing removed (No bath)     Activity as tolerated       Significant Diagnostic Studies: Labs:   BMP:   Recent Labs   Lab 01/18/22  1250 01/19/22  0457   GLU 96 99   * 136   K 3.5 3.5   CL 98 101   CO2 23 26   BUN 6 <5*   CREATININE 0.7 0.8   CALCIUM 9.1 8.8   , CMP   Recent Labs   Lab 01/18/22  1250 01/19/22  0457   * 136   K 3.5 3.5   CL 98 101   CO2 23 26   GLU 96 99   BUN 6 <5*   CREATININE 0.7 0.8   CALCIUM 9.1 8.8   PROT 7.5  --    ALBUMIN 4.3  --    BILITOT 0.8  --    ALKPHOS 74  --    AST 16  --    ALT 12  --    ANIONGAP 12 9   ESTGFRAFRICA >60.0 >60.0   EGFRNONAA >60.0 >60.0   , CBC   Recent Labs   Lab 01/18/22  1250 01/18/22  1250 01/19/22  0457   WBC 9.10  --  8.60   HGB 10.7*  --  10.8*   HCT 34.2*   < > 35.5*     --  229    < > = values in this interval not displayed.   , INR No results found for: INR, PROTIME, Lipid Panel   Lab Results   Component Value Date    CHOL 206 (H) 10/02/2019    HDL 48 10/02/2019    LDLCALC 134 (H) 10/02/2019    TRIG 118 10/02/2019   , Troponin No results for input(s): TROPONINI in the last 168 hours., A1C: No results for input(s): HGBA1C in the last 4320 hours. and All labs within the past 24 hours have been reviewed    Pending Diagnostic Studies:     Procedure Component Value Units Date/Time    Specimen to Pathology - Surgery [[id number]] Collected: 01/18/22 1934    Order Status: Sent Lab Status: No result     Specimen: Tissue        CT Abdomen Pelvis With Contrast    Result Date: 1/18/2022  CMS MANDATED QUALITY DATA - CT RADIATION - 436 All CT scans at this facility utilize dose modulation, iterative reconstruction,  and/or weight based dosing when appropriate to reduce radiation dose to as low as reasonably achievable. Reason: r10.31 RLQ PAIN AND VOMITING X 2 DAYS/; 100 OMNI TECHNIQUE: CT abdomen and pelvis with 100 mL Omnipaque 350. COMPARISON: None CT ABDOMEN: Partially visualized lung bases are clear. Enlarged appendix with mucosal hyperemia, fluid-filled lumen, and moderate periappendiceal inflammatory fat stranding is characteristic of acute appendicitis. No evidence of perforation. Intestines are otherwise unremarkable with enteric contrast reaching rectum without obstruction. No free intraperitoneal gas. 7 mm cyst lies superiorly in the right hepatic lobe, and liver is otherwise normal. Gallbladder, pancreas, spleen, adrenals, and kidneys are normal. Aorta is normal. No acute osseous abnormality. CT PELVIS: Bladder is normal. Uterus and ovaries are normal. Physiologic amount of free fluid lies in the pelvis. IMPRESSION: Acute appendicitis. RESULT NOTIFICATION: These observations were discussed by Dr. Newton with, and acknowledged by, ERIN PERRY at 1150. Patient was instructed to go to the emergency department. Electronically signed by:  Aubrey Newton MD  1/18/2022 12:00 PM [facility] Workstation: [id number]  Medications:  Reconciled Home Medications:      Medication List      START taking these medications    acetaminophen 500 MG tablet  Commonly known as: TYLENOL  Take 2 tablets (1,000 mg total) by mouth 3 (three) times daily. for 3 days     oxyCODONE 5 MG immediate release tablet  Commonly known as: ROXICODONE  Take 1 tablet (5 mg total) by mouth every 6 (six) hours as needed for Pain.        CHANGE how you take these medications    ibuprofen 800 MG tablet  Commonly known as: ADVIL,MOTRIN  Take 1 tablet (800 mg total) by mouth 3 (three) times daily. for 3 days  What changed:   · medication strength  · when to take this  · reasons to take this        CONTINUE taking these medications    ALPRAZolam 0.5 MG tablet  Commonly  known as: XANAX  Take 1 tablet by mouth 2 (two) times a day.     melatonin 3 mg tablet  Commonly known as: MELATIN  Take 1 tablet (3 mg total) by mouth nightly.        STOP taking these medications    amoxicillin-clavulanate 875-125mg 875-125 mg per tablet  Commonly known as: AUGMENTIN     ranitidine 150 MG tablet  Commonly known as: ZANTAC            Indwelling Lines/Drains at time of discharge:   Lines/Drains/Airways     None                 Time spent on the discharge of patient: 28 minutes         Tammy Field MD  Department of Hospital Medicine  [facility]

## 2022-01-19 NOTE — HOSPITAL COURSE
Patient was admitted with right lower abdominal pain with outpatient CT scan enlarged appendix, fluid filled with moderate surrounding inflammation concerning for acute appendicitis without any perforation.  Also incidentally found to have a 7 mm right hepatic cyst.  She was referred to the ED, afebrile, no leukocytosis, stable vitals, lipase 25, LFTs within normal, UA negative.  Seen by General surgery and underwent laparoscopic appendectomy on 01/18 and was monitored overnight postprocedure.  On 01/19 diet was slowly advanced and tolerating, discomfort over laparoscopic incision, different from presenting pain, no bowel movement yet, otherwise feels well, cleared by consultant for discharge and feels ready for discharge.  Discharge plan including medications, follow-up as well as strict return precautions were reviewed.  Review course appendectomy restrictions.  No objection to discharge home all questions were addressed.  Communicated with nursing on day of discharge.    Discharge examination  Lying in bed, alert and oriented, on room air, abdomen nondistended, laparoscopic incisions well approximated

## 2022-01-19 NOTE — PLAN OF CARE
Chart and discharge orders reviewed.  Patient discharged home with no further case management needs         01/19/22 1132   Final Note   Assessment Type Final Discharge Note   Anticipated Discharge Disposition Home

## 2022-01-19 NOTE — ANESTHESIA PROCEDURE NOTES
Intubation    Date/Time: 1/18/2022 6:46 PM  Performed by: Gely Doll CRNA  Authorized by: Olegario Mckeon MD     Intubation:     Induction:  Intravenous    Intubated:  Postinduction    Mask Ventilation:  N/a    Attempts:  1    Attempted By:  CRNA    Method of Intubation:  Video laryngoscopy    Blade:  Yang 3    Laryngeal View Grade: Grade I - full view of cords      Difficult Airway Encountered?: No      Complications:  None    Airway Device:  Oral endotracheal tube    Airway Device Size:  7.5    Style/Cuff Inflation:  Cuffed    Tube secured:  21    Secured at:  The lips    Placement Verified By:  Capnometry    Complicating Factors:  None    Findings Post-Intubation:  BS equal bilateral and atraumatic/condition of teeth unchanged

## 2022-01-19 NOTE — OP NOTE
DATE OF PROCEDURE: 01/18/2022    PREOPERATIVE DIAGNOSIS:  Acute appendicitis    POSTOPERATIVE DIAGNOSIS:  Same    PROCEDURE:  Laparoscopic appendectomy    SURGEON: Matthew Gomes M.D    ASSISTANT: None    ANESTHESIA:  General    ESTIMATED BLOOD LOSS:  Minimal    SPECIMEN:  Appendix    CONDITION:  Stable    COMPLICATIONS: None    FINDINGS:   Diffusely inflamed and thickened appendix but no perforation, phlegmon or abscess    INDICATIONS: The patient is a 36-year-old female who presented with right lower quadrant abdominal pain.  Imaging was consistent with acute appendicitis.  Counseled on treatment options and agreed proceed with laparoscopic appendectomy.    PROCEDURE IN DETAIL:  Patient taken operating room placed supine position.  2 g of cefoxitin were administered.  General endotracheal intubation was achieved.  A Persaud was placed to decompress the bladder removed and then the case.  Her left arm was tucked with appropriate padding.  Her abdomen is prepped and draped typical sterile fashion.  Time-out performed by members of the operative team.  Veress needle was inserted at paniagua's point in left upper abdomen.  It is attached insufflation and appropriate initial pressures were present and pneumoperitoneum was achieved.  Local anesthetic was injected and a stab incision was made in the left lower mid abdomen.  A 5 mm Optiview trocar was inserted without incident.  A Veress insertion site was without injury.  Additional 5 mm trocar was placed in suprapubic area after injecting local anesthetic and a 12 mm trocar was placed in a supra umbilical location after injecting local anesthetic.  Were able to identify the appendix which was thickened and inflamed.  This was present throughout the entire length of the appendix to the base which was also thickened.  Using LigaSure device we dissected through the thickened mesoappendix.  Hemostasis was good.  Mild adhesions to the sidewall were also taken down.  We were  then able to isolate the appendix at the base where it joined into the cecum.  This was somewhat thickened.  We elected to use a blue load linear stapler to transect the base of the appendix taking a small rim of cecum with it.  Once this was completed the appendix was placed in an Endo-Catch bag and removed through the 12 mm port site.  Our staple line appeared healthy and intact.  There was no significant bleeding from the mesoappendix.  There was no significant abscess, phlegmon present.  We injected additional local anesthetic.  We closed 12 mm trocar site with a 0 Vicryl suture using a [place] needle Passer.  We then desufflated the abdomen pneumoperitoneum removed our other 2 trocars.  All skin incisions were then closed with 4-0 Monocryl subcuticular stitches and Dermabond.  Persaud catheter was removed.  Patient was aroused from sedation, extubated taken to the recovery room in stable condition having suffered no complications.  All counts were correct x2 the case.  I was present scrubbed throughout the case.    DISPO:  PACU then admit to floor

## 2022-01-19 NOTE — PROGRESS NOTES
General Surgery Progress Note    Admit Date: 1/18/2022  S/P: Procedure(s) (LRB):  APPENDECTOMY, LAPAROSCOPIC (N/A)    Post-operative Day: 1 Day Post-Op    Hospital Day: 2    SUBJECTIVE:   Status post laparoscopic appendectomy yesterday evening.  No acute events overnight.  Patient has soreness in postoperative pain.  Tolerating a minimal amount of p.o..  Afebrile.    OBJECTIVE:     Vital Signs (Most Recent)  Temp:  [97.7 °F (36.5 °C)-98.7 °F (37.1 °C)] 98.7 °F (37.1 °C)  Pulse:  [] 93  Resp:  [16-20] 19  SpO2:  [98 %-100 %] 100 %  BP: (110-131)/(63-88) 131/65    I&Os:  I/O last 3 completed shifts:  In: 3340 [P.O.:240; I.V.:3000; IV Piggyback:100]  Out: 265 [Urine:245; Blood:20]    Physical Exam:  Gen: NAD, AAOx3  HEENT: Anicteric sclera  Pulm: unlabored, symmetrical   Abd:  Soft with appropriate tenderness palpation, no rebound, no guarding    Laboratory:  CBC:   Recent Labs   Lab 01/19/22  0457   WBC 8.60   RBC 4.62   HGB 10.8*   HCT 35.5*      MCV 77*   MCH 23.4*   MCHC 30.4*     BMP:   Recent Labs   Lab 01/19/22  0457   GLU 99      K 3.5      CO2 26   BUN <5*   CREATININE 0.8   CALCIUM 8.8     Labs within the past 24 hours have been reviewed.    ASSESSMENT/PLAN:     Patient Active Problem List    Diagnosis Date Noted    Acute appendicitis 01/18/2022    Non-smoker 10/01/2019         36 y.o. female status post laparoscopic appendectomy  - continue diet as tolerated  - discussed at surgical pain should continue to improve  - should be okay for discharge home today  - postop instructions have been left in the discharge tab  - recommend otc Tylenol 1000 mg every 8 hours for 3 days, otc ibuprofen 800 mg every 8 hours for 3 days, oxycodone 5 mg every 4-6 hours p.r.n. times 20 tabs on discharge  - will arrange follow-up with me in 2 weeks    Please call with any questions or concerns.    Matthew Schoolfield MD  General Surgery  [phone]

## 2022-02-04 ENCOUNTER — OFFICE VISIT (OUTPATIENT)
Dept: SURGERY | Facility: CLINIC | Age: 37
End: 2022-02-04
Payer: COMMERCIAL

## 2022-02-04 VITALS — WEIGHT: 197.56 LBS | BODY MASS INDEX: 34.99 KG/M2

## 2022-02-04 DIAGNOSIS — Z90.49 STATUS POST APPENDECTOMY: Primary | ICD-10-CM

## 2022-02-04 PROCEDURE — 99999 PR PBB SHADOW E&M-EST. PATIENT-LVL II: ICD-10-PCS | Mod: PBBFAC,,, | Performed by: SURGERY

## 2022-02-04 PROCEDURE — 3008F BODY MASS INDEX DOCD: CPT | Mod: CPTII,S$GLB,, | Performed by: SURGERY

## 2022-02-04 PROCEDURE — 99024 POSTOP FOLLOW-UP VISIT: CPT | Mod: S$GLB,,, | Performed by: SURGERY

## 2022-02-04 PROCEDURE — 99024 PR POST-OP FOLLOW-UP VISIT: ICD-10-PCS | Mod: S$GLB,,, | Performed by: SURGERY

## 2022-02-04 PROCEDURE — 1159F MED LIST DOCD IN RCRD: CPT | Mod: CPTII,S$GLB,, | Performed by: SURGERY

## 2022-02-04 PROCEDURE — 3008F PR BODY MASS INDEX (BMI) DOCUMENTED: ICD-10-PCS | Mod: CPTII,S$GLB,, | Performed by: SURGERY

## 2022-02-04 PROCEDURE — 99999 PR PBB SHADOW E&M-EST. PATIENT-LVL II: CPT | Mod: PBBFAC,,, | Performed by: SURGERY

## 2022-02-04 PROCEDURE — 1160F RVW MEDS BY RX/DR IN RCRD: CPT | Mod: CPTII,S$GLB,, | Performed by: SURGERY

## 2022-02-04 PROCEDURE — 1160F PR REVIEW ALL MEDS BY PRESCRIBER/CLIN PHARMACIST DOCUMENTED: ICD-10-PCS | Mod: CPTII,S$GLB,, | Performed by: SURGERY

## 2022-02-04 PROCEDURE — 1159F PR MEDICATION LIST DOCUMENTED IN MEDICAL RECORD: ICD-10-PCS | Mod: CPTII,S$GLB,, | Performed by: SURGERY

## 2022-02-04 NOTE — PROGRESS NOTES
GENERAL SURGERY  POST-OP PROGRESS NOTE    HPI: eJnny Bailey is a 36 y.o. female status post laparoscopic appendectomy here for follow-up.  Patient is doing well.  Some residual tenderness specially at the umbilicus but this is manageable.  No fevers, chills, nausea, vomiting.  Tolerating diet.  Having bowel function.    VITALS:  There were no vitals filed for this visit.    PHYSICAL EXAM:  Abdominal port sites well healed without signs of infection or breakdown    PATHOLOGY:    APPENDIX, APPENDECTOMY:   - ACUTE APPENDICITIS.    ASSESSMENT & PLAN:  36 y.o. female s/p laparoscopic appendectomy  - doing well postoperatively  - pathology benign  - incisions healing well  - avoid straining and heavy lifting for a total 4 weeks  - return clinic p.r.n.

## (undated) DEVICE — BORDER MEPILEX SILICONE 9.2X9.2IN 282455

## (undated) DEVICE — NEEDLE INSUFFLATION 150MM PN150

## (undated) DEVICE — TROCAR ENDOPATH XCEL BLADELESS B5LT

## (undated) DEVICE — SUTURE VICRYL #0 27 UR-6 VCP603H

## (undated) DEVICE — SYRINGE HYPODERMC LL 22G 1.5 ECLIPSE 30

## (undated) DEVICE — SCISSORS 5MM APPLIED MEDICAL   CB030

## (undated) DEVICE — TRAY GENERAL LAPAROSCOPY

## (undated) DEVICE — TROCAR BLADED ZTHREAD 11MM X 100MM CTB33

## (undated) DEVICE — SOLUTION IRRI H2O BOTTLE 1000ML

## (undated) DEVICE — GLOVE BIOGEL PI  GOLD SZ 7

## (undated) DEVICE — RELOAD LINEAR 6R45B

## (undated) DEVICE — PAD BOVIE ADULT

## (undated) DEVICE — DERMABOND HVD MINI  DHVM12

## (undated) DEVICE — RETRIEVER ENDOPOUCH SPEC BAG

## (undated) DEVICE — Device

## (undated) DEVICE — CUTTER LINEAR FLEX ARTICNG 45MM ATS45

## (undated) DEVICE — LIGASURE MARYLAND LF1937 REPLACES LF1737

## (undated) DEVICE — SOLUTION IRRI NS BOTTLE 1000ML R5200-01

## (undated) DEVICE — SLEEVE TROCAR ENDOPATH XCEL